# Patient Record
Sex: FEMALE | Race: WHITE | NOT HISPANIC OR LATINO | Employment: FULL TIME | URBAN - METROPOLITAN AREA
[De-identification: names, ages, dates, MRNs, and addresses within clinical notes are randomized per-mention and may not be internally consistent; named-entity substitution may affect disease eponyms.]

---

## 2017-01-08 ASSESSMENT — ENCOUNTER SYMPTOMS
INCREASED ENERGY: 0
TASTE DISTURBANCE: 0
TINGLING: 1
SKIN CHANGES: 0
HEARTBURN: 1
SINUS PAIN: 1
ALTERED TEMPERATURE REGULATION: 0
POLYPHAGIA: 0
JOINT SWELLING: 0
VOMITING: 0
SMELL DISTURBANCE: 0
HYPERTENSION: 1
POSTURAL DYSPNEA: 0
SLEEP DISTURBANCES DUE TO BREATHING: 0
WEIGHT GAIN: 0
NECK PAIN: 1
DIZZINESS: 0
TROUBLE SWALLOWING: 0
EYE WATERING: 0
DOUBLE VISION: 0
CONSTIPATION: 0
POOR WOUND HEALING: 0
MYALGIAS: 0
DEPRESSION: 0
STIFFNESS: 1
WEIGHT LOSS: 0
SYNCOPE: 0
MUSCLE CRAMPS: 0
LEG PAIN: 0
DECREASED LIBIDO: 1
NUMBNESS: 1
SHORTNESS OF BREATH: 0
NAUSEA: 0
DIFFICULTY URINATING: 0
FATIGUE: 0
ARTHRALGIAS: 1
DISTURBANCES IN COORDINATION: 0
CLAUDICATION: 0
LIGHT-HEADEDNESS: 0
INSOMNIA: 0
SPEECH CHANGE: 0
PALPITATIONS: 0
DYSURIA: 0
MUSCLE WEAKNESS: 0
DYSPNEA ON EXERTION: 0
BLOOD IN STOOL: 0
RECTAL PAIN: 0
NIGHT SWEATS: 0
HEADACHES: 0
COUGH DISTURBING SLEEP: 0
SEIZURES: 0
DECREASED CONCENTRATION: 0
PARALYSIS: 0
EYE IRRITATION: 0
WEAKNESS: 0
HEMOPTYSIS: 0
TREMORS: 0
JAUNDICE: 0
EYE REDNESS: 0
NAIL CHANGES: 0
BLOATING: 1
ORTHOPNEA: 0
COUGH: 0
NECK MASS: 0
LEG SWELLING: 0
EXERCISE INTOLERANCE: 0
SPUTUM PRODUCTION: 0
DIARRHEA: 0
BOWEL INCONTINENCE: 0
MEMORY LOSS: 0
HOT FLASHES: 1
RESPIRATORY PAIN: 0
SORE THROAT: 0
TACHYCARDIA: 0
NERVOUS/ANXIOUS: 1
RECTAL BLEEDING: 0
HYPOTENSION: 0
POLYDIPSIA: 0
PANIC: 1
HALLUCINATIONS: 0
CHILLS: 0
HEMATURIA: 0
WHEEZING: 0
DECREASED APPETITE: 0
LOSS OF CONSCIOUSNESS: 0
HOARSE VOICE: 0
BACK PAIN: 1
FEVER: 0
EYE PAIN: 0
SNORES LOUDLY: 1
ABDOMINAL PAIN: 0
FLANK PAIN: 0

## 2017-01-08 ASSESSMENT — ANXIETY QUESTIONNAIRES
GAD7 TOTAL SCORE: 2
7. FEELING AFRAID AS IF SOMETHING AWFUL MIGHT HAPPEN: 1 = SEVERAL DAYS
GAD7 TOTAL SCORE: 2

## 2017-01-09 ASSESSMENT — ANXIETY QUESTIONNAIRES: GAD7 TOTAL SCORE: 2

## 2017-01-09 NOTE — PROGRESS NOTES
Sanket is a 61 year old female presents for annual exam: new patient to [facility]  - Last pap 2014  HPI:  - Health care is primarily with Homeopathy.   - Has white coat syndrome and elevated BP.  Not inclined to take BP medication. Last /88.  ROS:  General: Hot flashes 1-2/day  Head/Eyes: none  Ears/Nose/Throat: none  Cardiovascular: none  Respiratory: none  Gastrointestinal: none  Breast: none  Genitourinary: none  Sexual Function: not sexually active.  Flares of lesion [mostly rectal area] every 2-3 months.   Musculoskeletal: none  Skin: none  Neurological: none  Mental Health: none  Endocrine: none  PROBLEM LIST:  Patient Active Problem List   Diagnosis     CARDIOVASCULAR SCREENING; LDL GOAL LESS THAN 160     Raynaud's disease     CTS (carpal tunnel syndrome)     Herpes genitalia     Labial cyst     Obesity   OB/GYN HISTORY:   Menopause ~ hysterectomy . Still with cervix  Herpes genitalia flares [every 2-3 month] uses homeopathic remedy  Obstetric History       T0      TAB1   SAB0   E0   M0   L1       # Outcome Date GA Lbr Mario/2nd Weight Sex Delivery Anes PTL Lv   2 TAB            1 Para               PAST MEDICAL HISTORY:  Past Medical History   Diagnosis Date     Hypertension 2009     going through divorce     Raynaud's disease      CTS (carpal tunnel syndrome)      rt >lt      Labial cyst      Herpes genitalia      type 1 and 2   Life Style Modifiers:   Tobacco:  reports that she has never smoked. She has never used smokeless tobacco.   Alcohol:  reports that she does not drink alcohol.   Drug use:  reports that she does not use illicit drugs.  Exercise: none                Diet: ok  CAM Providers:      Homeopath: Yes  PAST SURGICAL HISTORY:  Past Surgical History   Procedure Laterality Date     Appendectomy  2010     Hysterectomy supracervical        removal of anal fissure        section     FAMILY HISTORY:  Mother with Hypertension age 90.   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 Single.   MEDICATIONS:  No current outpatient prescriptions on file.   ALLERGIES:  Atropine; Codeine; Epinephrine; Fentanyl; and Inh  VITAL:  Blood pressure 149/88, pulse 96.  PHYSICAL EXAM:  Constitutional: Well appearing woman in no acute distress.   Psychological: appropriate mood.  Eyes: anicteric, normal extra-ocular movements,    Ears, Nose and Throat: tympanic membranes clear, nose clear and free of lesions, throat clear, moist mucous membrames, neck supple with full range of motion.    Neck: No thyroidmegaly.  Cardiovascular: regular rate and rhythm, normal S1 and S2, no murmurs, rubs or gallops, peripheral pulses full and symmetric   Respiratory: clear to auscultation, no wheezes or crackles, normal breath sounds.  Breast: Symmetrical without visible distortion or swelling. No masses noted. No nipple inversion, no breast dimpling or puckering. Axillary area without masses or lympadenapathy.   Gastrointestinal: positive bowel sounds, nontender, no hepatosplenomegaly, no masses. No guarding or rebound.  Genitourinary: External genitalia with a labial cyst on the right [1.5 cm].  No enlargement of the Bartholin or [place] glands. Urethra and bladder are non-tender. Vagina is without lesions or discharge. Dry epithelium, no anterior or posterior wall defects. Cervix is smooth, without lesions.  Pap obtained/HPV obtained with cytobrush. Adnexa without tenderness or enlarged. Rectal exam with normal sphincter tone, no masses. Perineum without lesions.   Musculoskeletal: full range of motion    Skin: no concerning lesions, no jaundice.  Neurological: normal gait, no tremor.   Diagnoses and associated orders for this visit:  Annual physical exam   - declined immunizations   - does thermography - advised mammogram.  Cervical cancer screening  -     Obtaining, preparing and conveyance of cervical or vaginal smear to laboratory.  -      Pap imaged thin layer screen with HPV - recommended age 30 - 65 years (select HPV order below)  Labial cyst        -     Consider excision with GYN   Need for hepatitis C screening test  -     Hepatitis C antibody  Screening for thyroid disorder  -     TSH with free T4 reflex  Screening for diabetes mellitus  -     Basic Metabolic Panel  Elevated blood pressure reading without diagnosis of hypertension  -     Basic Metabolic Panel  -     Consider diuretic - will get a few more BP before starting a medicaiton   Herpes simplex vulvo/rectal area        -     Discussed option for Valtrex

## 2017-01-10 ENCOUNTER — OFFICE VISIT (OUTPATIENT)
Dept: FAMILY MEDICINE | Facility: CLINIC | Age: 62
End: 2017-01-10
Attending: FAMILY MEDICINE
Payer: COMMERCIAL

## 2017-01-10 ENCOUNTER — RADIANT APPOINTMENT (OUTPATIENT)
Dept: MAMMOGRAPHY | Facility: CLINIC | Age: 62
End: 2017-01-10
Attending: FAMILY MEDICINE
Payer: COMMERCIAL

## 2017-01-10 VITALS — HEART RATE: 96 BPM | DIASTOLIC BLOOD PRESSURE: 88 MMHG | SYSTOLIC BLOOD PRESSURE: 149 MMHG

## 2017-01-10 DIAGNOSIS — Z12.31 VISIT FOR SCREENING MAMMOGRAM: ICD-10-CM

## 2017-01-10 DIAGNOSIS — A60.04 HERPES SIMPLEX VULVOVAGINITIS: ICD-10-CM

## 2017-01-10 DIAGNOSIS — N90.7 LABIAL CYST: ICD-10-CM

## 2017-01-10 DIAGNOSIS — Z13.1 SCREENING FOR DIABETES MELLITUS: ICD-10-CM

## 2017-01-10 DIAGNOSIS — Z12.4 CERVICAL CANCER SCREENING: ICD-10-CM

## 2017-01-10 DIAGNOSIS — R03.0 ELEVATED BLOOD PRESSURE READING WITHOUT DIAGNOSIS OF HYPERTENSION: ICD-10-CM

## 2017-01-10 DIAGNOSIS — Z11.59 NEED FOR HEPATITIS C SCREENING TEST: ICD-10-CM

## 2017-01-10 DIAGNOSIS — Z00.00 ANNUAL PHYSICAL EXAM: Primary | ICD-10-CM

## 2017-01-10 DIAGNOSIS — Z13.29 SCREENING FOR THYROID DISORDER: ICD-10-CM

## 2017-01-10 LAB
ANION GAP SERPL CALCULATED.3IONS-SCNC: 6 MMOL/L (ref 3–14)
BUN SERPL-MCNC: 28 MG/DL (ref 7–30)
CALCIUM SERPL-MCNC: 8.8 MG/DL (ref 8.5–10.1)
CHLORIDE SERPL-SCNC: 104 MMOL/L (ref 94–109)
CO2 SERPL-SCNC: 29 MMOL/L (ref 20–32)
CREAT SERPL-MCNC: 0.88 MG/DL (ref 0.52–1.04)
GFR SERPL CREATININE-BSD FRML MDRD: 66 ML/MIN/1.7M2
GLUCOSE SERPL-MCNC: 106 MG/DL (ref 70–99)
HCV AB SERPL QL IA: NORMAL
POTASSIUM SERPL-SCNC: 3.9 MMOL/L (ref 3.4–5.3)
SODIUM SERPL-SCNC: 139 MMOL/L (ref 133–144)
TSH SERPL DL<=0.005 MIU/L-ACNC: 2.47 MU/L (ref 0.4–4)

## 2017-01-10 PROCEDURE — 80048 BASIC METABOLIC PNL TOTAL CA: CPT | Performed by: FAMILY MEDICINE

## 2017-01-10 PROCEDURE — 86803 HEPATITIS C AB TEST: CPT | Performed by: FAMILY MEDICINE

## 2017-01-10 PROCEDURE — G0202 SCR MAMMO BI INCL CAD: HCPCS

## 2017-01-10 PROCEDURE — G0123 SCREEN CERV/VAG THIN LAYER: HCPCS | Performed by: FAMILY MEDICINE

## 2017-01-10 PROCEDURE — 84443 ASSAY THYROID STIM HORMONE: CPT | Performed by: FAMILY MEDICINE

## 2017-01-10 PROCEDURE — G0145 SCR C/V CYTO,THINLAYER,RESCR: HCPCS | Performed by: FAMILY MEDICINE

## 2017-01-10 PROCEDURE — 36415 COLL VENOUS BLD VENIPUNCTURE: CPT | Performed by: FAMILY MEDICINE

## 2017-01-10 PROCEDURE — 99213 OFFICE O/P EST LOW 20 MIN: CPT | Mod: 25,ZF

## 2017-01-10 PROCEDURE — 87624 HPV HI-RISK TYP POOLED RSLT: CPT | Performed by: FAMILY MEDICINE

## 2017-01-10 NOTE — Clinical Note
1/10/2017       RE: Sanket Sepulveda  [address] [id number]     Dear Colleague,    Thank you for referring your patient, Sanket Sepulveda, to the [facility] at [facility]. Please see a copy of my visit note below.    Sanket is a 61 year old female presents for annual exam: new patient to [facility]  - Last pap   HPI:  - Health care is primarily with Homeopathy.   - Has white coat syndrome and elevated BP.  Not inclined to take BP medication. Last /88.  ROS:  General: Hot flashes 1-2/day  Head/Eyes: none  Ears/Nose/Throat: none  Cardiovascular: none  Respiratory: none  Gastrointestinal: none  Breast: none  Genitourinary: none  Sexual Function: not sexually active.  Flares of lesion [mostly rectal area] every 2-3 months.   Musculoskeletal: none  Skin: none  Neurological: none  Mental Health: none  Endocrine: none  PROBLEM LIST:  Patient Active Problem List   Diagnosis     CARDIOVASCULAR SCREENING; LDL GOAL LESS THAN 160     Raynaud's disease     CTS (carpal tunnel syndrome)     Herpes genitalia     Labial cyst     Obesity   OB/GYN HISTORY:   Menopause ~ hysterectomy . Still with cervix  Herpes genitalia flares [every 2-3 month] uses homeopathic remedy  Obstetric History       T0      TAB1   SAB0   E0   M0   L1       # Outcome Date GA Lbr Mario/2nd Weight Sex Delivery Anes PTL Lv   2 TAB            1 Para               PAST MEDICAL HISTORY:  Past Medical History   Diagnosis Date     Hypertension      going through divorce     Raynaud's disease      CTS (carpal tunnel syndrome)      rt >lt      Labial cyst      Herpes genitalia      type 1 and 2   Life Style Modifiers:   Tobacco:  reports that she has never smoked. She has never used smokeless tobacco.   Alcohol:  reports that she does not drink alcohol.   Drug use:  reports that she does not use illicit drugs.  Exercise: none                Diet: ok  CAM Providers:       Homeopath: Yes  PAST SURGICAL HISTORY:  Past Surgical History   Procedure Laterality Date     Appendectomy  2010     Hysterectomy supracervical        removal of anal fissure        section     FAMILY HISTORY:  Mother with Hypertension age 90.   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   MEDICATIONS:  No current outpatient prescriptions on file.   ALLERGIES:  Atropine; Codeine; Epinephrine; Fentanyl; and Inh  VITAL:  Blood pressure 149/88, pulse 96.  PHYSICAL EXAM:  Constitutional: Well appearing woman in no acute distress.   Psychological: appropriate mood.  Eyes: anicteric, normal extra-ocular movements,    Ears, Nose and Throat: tympanic membranes clear, nose clear and free of lesions, throat clear, moist mucous membrames, neck supple with full range of motion.    Neck: No thyroidmegaly.  Cardiovascular: regular rate and rhythm, normal S1 and S2, no murmurs, rubs or gallops, peripheral pulses full and symmetric   Respiratory: clear to auscultation, no wheezes or crackles, normal breath sounds.  Breast: Symmetrical without visible distortion or swelling. No masses noted. No nipple inversion, no breast dimpling or puckering. Axillary area without masses or lympadenapathy.   Gastrointestinal: positive bowel sounds, nontender, no hepatosplenomegaly, no masses. No guarding or rebound.  Genitourinary: External genitalia with a labial cyst on the right [1.5 cm].  No enlargement of the Bartholin or [place] glands. Urethra and bladder are non-tender. Vagina is without lesions or discharge. Dry epithelium, no anterior or posterior wall defects. Cervix is smooth, without lesions.  Pap obtained/HPV obtained with cytobrush. Adnexa without tenderness or enlarged. Rectal exam with normal sphincter tone, no masses. Perineum without lesions.   Musculoskeletal: full range of motion    Skin: no concerning lesions, no  jaundice.  Neurological: normal gait, no tremor.   Diagnoses and associated orders for this visit:  Annual physical exam   - declined immunizations   - does thermography - advised mammogram.  Cervical cancer screening  -     Obtaining, preparing and conveyance of cervical or vaginal smear to laboratory.  -     Pap imaged thin layer screen with HPV - recommended age 30 - 65 years (select HPV order below)  Labial cyst        -     Consider excision with GYN   Need for hepatitis C screening test  -     Hepatitis C antibody  Screening for thyroid disorder  -     TSH with free T4 reflex  Screening for diabetes mellitus  -     Basic Metabolic Panel  Elevated blood pressure reading without diagnosis of hypertension  -     Basic Metabolic Panel  -     Consider diuretic - will get a few more BP before starting a medicaiton   Herpes simplex vulvo/rectal area        -     Discussed option for Valtrex       Again, thank you for allowing me to participate in the care of your patient.      Sincerely,    Hailey Gomez MD

## 2017-01-12 LAB
COPATH REPORT: NORMAL
PAP: NORMAL

## 2017-01-19 LAB
FINAL DIAGNOSIS: NORMAL
HPV HR 12 DNA CVX QL NAA+PROBE: NEGATIVE
HPV16 DNA SPEC QL NAA+PROBE: NEGATIVE
HPV18 DNA SPEC QL NAA+PROBE: NEGATIVE
SPECIMEN DESCRIPTION: NORMAL

## 2017-12-20 NOTE — PROGRESS NOTES
Sanket is a 61 year old female presents symptoms:  Concerns:   - unexplained aches and pain that started after cleaning and remodeling her home with scrapping of paint.  Saw her naturopath and given a liver cleanse.  Had a herpes outbreak, rectal with mild headache and achy that may be causing some of her symptoms.  Gets an outbreak ~ four times a year [symptoms are less over years].    Most significant was RUQ pain last week that has now resolved.     Health care is primarily with homeopathist and naturopath   Having more gas pain - thinks it is from the protein powder and smoothy in the AM.  HCM: Last pap   Exercise: running since 2017.  Rec center with running track.   ROS:  General: Hot flashes 1-2/day  Head/Eyes: none  Ears/Nose/Throat: none  Cardiovascular: none  Respiratory: none  Gastrointestinal: none  Breast: none  Genitourinary: none  Sexual Function: not sexually active.  Flares of lesion [mostly rectal area] every 2-3 months.   Musculoskeletal: none  Skin: none  Neurological: none  Mental Health: none  Endocrine: none  PROBLEM LIST:  Patient Active Problem List   Diagnosis     CARDIOVASCULAR SCREENING; LDL GOAL LESS THAN 160     Raynaud's disease     CTS (carpal tunnel syndrome)     Herpes genitalia     Labial cyst     Obesity     Sebaceous cyst     Lipoma     Hypertension     Pain of hand     Gastroesophageal reflux disease     Chronic eczema   OB/GYN HISTORY:   Menopause ~ hysterectomy . Still with cervix  Herpes genitalia flares [every 2-3 month] uses homeopathic remedy  Obstetric History       T0      L1     SAB0   TAB1   Ectopic0   Multiple0   Live Births0       # Outcome Date GA Lbr Mario/2nd Weight Sex Delivery Anes PTL Lv   2 TAB            1 Para               PAST MEDICAL HISTORY:  Past Medical History:   Diagnosis Date     Anemia     Throughout 30s due to heavy period caused by uterine fibroid     Complication of anesthesia ,     prior to fissurectomy  "surgery in  and colonoscopy ?     CTS (carpal tunnel syndrome)     rt >lt      Herpes genitalia     type 1 and 2     Hypertension 2009    going through divorce     Labial cyst      Raynaud's disease      Thyroid disease ?    Hypothyroid, treated homeopathically   Life Style Modifiers:   Tobacco:  reports that she has never smoked. She has never used smokeless tobacco.   Alcohol:  reports that she drinks alcohol.   Drug use:  reports that she does not use illicit drugs.  Exercise: none                Diet: ok  CAM Providers:      Homeopath: Yes  PAST SURGICAL HISTORY:  Past Surgical History:   Procedure Laterality Date     APPENDECTOMY  2010     BIOPSY      prior to upper abdominal lump removed      SECTION       COLONOSCOPY  ?    complications with anesthesia and results     GENITOURINARY SURGERY      bladder cut open from  complications     HC REMOVAL OF ANAL FISSURE       HYSTERECTOMY SUPRACERVICAL       SOFT TISSUE SURGERY      upper abdominal lump removed   FAMILY HISTORY:  Mother with Hypertension age 90.   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   MEDICATIONS:  No current outpatient prescriptions on file.   ALLERGIES:  Atropine; Codeine; Epinephrine; Fentanyl; and Inh [isoniazid]  VITAL:  Blood pressure (!) 154/99, pulse 102, height 1.626 m (5' 4\"), weight 88.8 kg (195 lb 12.8 oz).  PHYSICAL EXAM:  Constitutional: Well appearing woman in no acute distress.   Psychological: appropriate mood.  Eyes: anicteric, normal extra-ocular movements,    Gastrointestinal: positive bowel sounds, nontender, no hepatosplenomegaly, no masses. No guarding or rebound.  Musculoskeletal: full range of motion    Skin: no concerning lesions, no jaundice.  Neurological: normal gait, no tremor.   Diagnoses and associated orders for this visit:  Elevated blood pressure reading without diagnosis of " hypertension  -     Advised hydrochlorothiazide (MICROZIDE) 12.5 MG capsule; Take 1 capsule (12.5 mg) by mouth daily.  She will consider taking.   -     Basic Metabolic Panel; Future  Multiple joint pain  -     CBC with Platelets; Future  -     CRP Inflammation; Future  -     Hepatic Panel; Future  -     Lead Screening  Herpes simplex infection of rectum  -   Advised Valtrex - she will consider  -   Uses a homeopathic remedy with flares

## 2017-12-21 ENCOUNTER — OFFICE VISIT (OUTPATIENT)
Dept: FAMILY MEDICINE | Facility: CLINIC | Age: 62
End: 2017-12-21
Attending: FAMILY MEDICINE
Payer: COMMERCIAL

## 2017-12-21 VITALS
WEIGHT: 195.8 LBS | HEIGHT: 64 IN | SYSTOLIC BLOOD PRESSURE: 154 MMHG | HEART RATE: 102 BPM | BODY MASS INDEX: 33.43 KG/M2 | DIASTOLIC BLOOD PRESSURE: 99 MMHG

## 2017-12-21 DIAGNOSIS — M25.50 MULTIPLE JOINT PAIN: ICD-10-CM

## 2017-12-21 DIAGNOSIS — A60.1 HERPES SIMPLEX INFECTION OF RECTUM: ICD-10-CM

## 2017-12-21 DIAGNOSIS — R03.0 ELEVATED BLOOD PRESSURE READING WITHOUT DIAGNOSIS OF HYPERTENSION: Primary | ICD-10-CM

## 2017-12-21 LAB
ALBUMIN SERPL-MCNC: 3.6 G/DL (ref 3.4–5)
ALP SERPL-CCNC: 66 U/L (ref 40–150)
ALT SERPL W P-5'-P-CCNC: 25 U/L (ref 0–50)
ANION GAP SERPL CALCULATED.3IONS-SCNC: 5 MMOL/L (ref 3–14)
AST SERPL W P-5'-P-CCNC: 14 U/L (ref 0–45)
BILIRUB DIRECT SERPL-MCNC: <0.1 MG/DL (ref 0–0.2)
BILIRUB SERPL-MCNC: 0.4 MG/DL (ref 0.2–1.3)
BUN SERPL-MCNC: 21 MG/DL (ref 7–30)
CALCIUM SERPL-MCNC: 9.2 MG/DL (ref 8.5–10.1)
CHLORIDE SERPL-SCNC: 104 MMOL/L (ref 94–109)
CO2 SERPL-SCNC: 29 MMOL/L (ref 20–32)
CREAT SERPL-MCNC: 0.78 MG/DL (ref 0.52–1.04)
CRP SERPL-MCNC: <2.9 MG/L (ref 0–8)
ERYTHROCYTE [DISTWIDTH] IN BLOOD BY AUTOMATED COUNT: 13.9 % (ref 10–15)
GFR SERPL CREATININE-BSD FRML MDRD: 76 ML/MIN/1.7M2
GLUCOSE SERPL-MCNC: 91 MG/DL (ref 70–99)
HCT VFR BLD AUTO: 44 % (ref 35–47)
HGB BLD-MCNC: 14.4 G/DL (ref 11.7–15.7)
MCH RBC QN AUTO: 29.1 PG (ref 26.5–33)
MCHC RBC AUTO-ENTMCNC: 32.7 G/DL (ref 31.5–36.5)
MCV RBC AUTO: 89 FL (ref 78–100)
PLATELET # BLD AUTO: 257 10E9/L (ref 150–450)
POTASSIUM SERPL-SCNC: 5.5 MMOL/L (ref 3.4–5.3)
PROT SERPL-MCNC: 7.3 G/DL (ref 6.8–8.8)
RBC # BLD AUTO: 4.95 10E12/L (ref 3.8–5.2)
SODIUM SERPL-SCNC: 138 MMOL/L (ref 133–144)
WBC # BLD AUTO: 4.3 10E9/L (ref 4–11)

## 2017-12-21 PROCEDURE — 83655 ASSAY OF LEAD: CPT | Performed by: FAMILY MEDICINE

## 2017-12-21 PROCEDURE — 36415 COLL VENOUS BLD VENIPUNCTURE: CPT | Performed by: FAMILY MEDICINE

## 2017-12-21 PROCEDURE — 80076 HEPATIC FUNCTION PANEL: CPT | Performed by: FAMILY MEDICINE

## 2017-12-21 PROCEDURE — 80048 BASIC METABOLIC PNL TOTAL CA: CPT | Performed by: FAMILY MEDICINE

## 2017-12-21 PROCEDURE — 86140 C-REACTIVE PROTEIN: CPT | Performed by: FAMILY MEDICINE

## 2017-12-21 PROCEDURE — 85027 COMPLETE CBC AUTOMATED: CPT | Performed by: FAMILY MEDICINE

## 2017-12-21 RX ORDER — HYDROCHLOROTHIAZIDE 12.5 MG/1
12.5 CAPSULE ORAL DAILY
Qty: 30 CAPSULE | Refills: 1 | Status: SHIPPED | OUTPATIENT
Start: 2017-12-21 | End: 2018-06-06

## 2017-12-21 RX ORDER — VALACYCLOVIR HYDROCHLORIDE 1 G/1
1000 TABLET, FILM COATED ORAL 3 TIMES DAILY
Qty: 21 TABLET | Refills: 1 | Status: SHIPPED | OUTPATIENT
Start: 2017-12-21 | End: 2022-09-01

## 2017-12-21 ASSESSMENT — PAIN SCALES - GENERAL: PAINLEVEL: NO PAIN (0)

## 2017-12-21 NOTE — NURSING NOTE
Chief Complaint   Patient presents with     Generalized Body Aches     Right side pain. Pt has concerns of lead paint and possibly other toxin exposure.       B/P readings 148/90, 157/113, 158/93

## 2017-12-21 NOTE — LETTER
2017       RE: Sanket Sepulveda  [address] [place] [address]     Dear Colleague,    Thank you for referring your patient, Sanket Sepulveda, to the [facility] at [facility]. Please see a copy of my visit note below.    Sanket is a 61 year old female presents symptoms:  Concerns:   - unexplained aches and pain that started after cleaning and remodeling her home with scrapping of paint.  Saw her naturopath and given a liver cleanse.  Had a herpes outbreak, rectal with mild headache and achy that may be causing some of her symptoms.  Gets an outbreak ~ four times a year [symptoms are less over years].    Most significant was RUQ pain last week that has now resolved.     Health care is primarily with homeopathist and naturopath   Having more gas pain - thinks it is from the protein powder and smoothy in the AM.  HCM: Last pap   Exercise: running since 2017.  Rec center with running track.   ROS:  General: Hot flashes 1-2/day  Head/Eyes: none  Ears/Nose/Throat: none  Cardiovascular: none  Respiratory: none  Gastrointestinal: none  Breast: none  Genitourinary: none  Sexual Function: not sexually active.  Flares of lesion [mostly rectal area] every 2-3 months.   Musculoskeletal: none  Skin: none  Neurological: none  Mental Health: none  Endocrine: none  PROBLEM LIST:  Patient Active Problem List   Diagnosis     CARDIOVASCULAR SCREENING; LDL GOAL LESS THAN 160     Raynaud's disease     CTS (carpal tunnel syndrome)     Herpes genitalia     Labial cyst     Obesity     Sebaceous cyst     Lipoma     Hypertension     Pain of hand     Gastroesophageal reflux disease     Chronic eczema   OB/GYN HISTORY:   Menopause ~ hysterectomy . Still with cervix  Herpes genitalia flares [every 2-3 month] uses homeopathic remedy  Obstetric History       T0      L1     SAB0   TAB1   Ectopic0   Multiple0   Live Births0       # Outcome Date GA Lbr  "Mario/2nd Weight Sex Delivery Anes PTL Lv   2 TAB            1 Para               PAST MEDICAL HISTORY:  Past Medical History:   Diagnosis Date     Anemia     Throughout 30s due to heavy period caused by uterine fibroid     Complication of anesthesia ,     prior to fissurectomy surgery in  and colonoscopy ?     CTS (carpal tunnel syndrome)     rt >lt      Herpes genitalia     type 1 and 2     Hypertension     going through divorce     Labial cyst      Raynaud's disease      Thyroid disease ?    Hypothyroid, treated homeopathically   Life Style Modifiers:   Tobacco:  reports that she has never smoked. She has never used smokeless tobacco.   Alcohol:  reports that she drinks alcohol.   Drug use:  reports that she does not use illicit drugs.  Exercise: none                Diet: ok  CAM Providers:      Homeopath: Yes  PAST SURGICAL HISTORY:  Past Surgical History:   Procedure Laterality Date     APPENDECTOMY       BIOPSY      prior to upper abdominal lump removed      SECTION       COLONOSCOPY  ?    complications with anesthesia and results     GENITOURINARY SURGERY      bladder cut open from  complications     HC REMOVAL OF ANAL FISSURE       HYSTERECTOMY SUPRACERVICAL       SOFT TISSUE SURGERY      upper abdominal lump removed   FAMILY HISTORY:  Mother with Hypertension age 90.   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   MEDICATIONS:  No current outpatient prescriptions on file.   ALLERGIES:  Atropine; Codeine; Epinephrine; Fentanyl; and Inh [isoniazid]  VITAL:  Blood pressure (!) 154/99, pulse 102, height 1.626 m (5' 4\"), weight 88.8 kg (195 lb 12.8 oz).  PHYSICAL EXAM:  Constitutional: Well appearing woman in no acute distress.   Psychological: appropriate mood.  Eyes: anicteric, normal extra-ocular movements,    Gastrointestinal: positive bowel sounds, nontender, " no hepatosplenomegaly, no masses. No guarding or rebound.  Musculoskeletal: full range of motion    Skin: no concerning lesions, no jaundice.  Neurological: normal gait, no tremor.   Diagnoses and associated orders for this visit:  Elevated blood pressure reading without diagnosis of hypertension  -     Advised hydrochlorothiazide (MICROZIDE) 12.5 MG capsule; Take 1 capsule (12.5 mg) by mouth daily.  She will consider taking.   -     Basic Metabolic Panel; Future  Multiple joint pain  -     CBC with Platelets; Future  -     CRP Inflammation; Future  -     Hepatic Panel; Future  -     Lead Screening  Herpes simplex infection of rectum  -   Advised Valtrex - she will consider  -   Uses a homeopathic remedy with flares        Again, thank you for allowing me to participate in the care of your patient.      Sincerely,    Hailey Gomez MD

## 2017-12-21 NOTE — MR AVS SNAPSHOT
After Visit Summary   12/21/2017    Sanket Sepulveda    MRN: [id number]           Patient Information     Date Of Birth          1955        Visit Information        Provider Department      12/21/2017 9:20 AM Hailey Gomez MD [facility]        Today's Diagnoses     Elevated blood pressure reading without diagnosis of hypertension    -  1    Multiple joint pain        Herpes simplex infection of rectum           Follow-ups after your visit        Future tests that were ordered for you today     Open Future Orders        Priority Expected Expires Ordered    Basic Metabolic Panel Routine  12/21/2018 12/21/2017    CBC with Platelets Routine  12/21/2018 12/21/2017    CRP Inflammation Routine  12/21/2018 12/21/2017    Hepatic Panel Routine  12/21/2018 12/21/2017            Who to contact     Please call your clinic at [phone] to:    Ask questions about your health    Make or cancel appointments    Discuss your medicines    Learn about your test results    Speak to your doctor   If you have compliments or concerns about an experience at your clinic, or if you wish to file a complaint, please contact [facility] Patient Relations at [phone] or email us at [email or web address]         Additional Information About Your Visit        MyChart Information     [employer] gives you secure access to your electronic health record. If you see a primary care provider, you can also send messages to your care team and make appointments. If you have questions, please call your primary care clinic.  If you do not have a primary care provider, please call [phone] and they will assist you.      [employer] is an electronic gateway that provides easy, online access to your medical records. With [employer], you can request a clinic appointment, read your test results, renew a prescription or communicate with your care team.     To access your existing  "account, please contact your [facility] or call [phone] for assistance.        Care EveryWhere ID     This is your Care EveryWhere ID. This could be used by other organizations to access your [facility] medical records  [id number]        Your Vitals Were     Pulse Height BMI (Body Mass Index)             102 1.626 m (5' 4\") 33.61 kg/m2          Blood Pressure from Last 3 Encounters:   12/21/17 (!) 154/99   01/10/17 149/88   10/01/14 148/88    Weight from Last 3 Encounters:   12/21/17 88.8 kg (195 lb 12.8 oz)   10/01/14 92.1 kg (203 lb)   03/31/14 89.6 kg (197 lb 8 oz)              We Performed the Following     Lead Screening          Today's Medication Changes          These changes are accurate as of: 12/21/17  9:57 AM.  If you have any questions, ask your nurse or doctor.               Start taking these medicines.        Dose/Directions    hydrochlorothiazide 12.5 MG capsule   Commonly known as:  MICROZIDE   Used for:  Elevated blood pressure reading without diagnosis of hypertension   Started by:  Hailey Gomez MD        Dose:  12.5 mg   Take 1 capsule (12.5 mg) by mouth daily   Quantity:  30 capsule   Refills:  1       valACYclovir 1000 mg tablet   Commonly known as:  VALTREX   Used for:  Herpes simplex infection of rectum   Started by:  Hailey Gomez MD        Dose:  1000 mg   Take 1 tablet (1,000 mg) by mouth 3 times daily   Quantity:  21 tablet   Refills:  1            Where to get your medicines      These medications were sent to [facility] - [place], [place] - [address]  [address], [place] [address]     Phone:  [phone]     hydrochlorothiazide 12.5 MG capsule    valACYclovir 1000 mg tablet                Primary Care Provider Office Phone # Fax #    Constance Cr [phone]       [facility] [address] [address]        Equal Access to Services     RIA MADDEN AH: Hadii cait " leilani Salmon, wabekahda lugilismael, qadonnata kaimkala pacheco, roland derekin hayaadante alexanderjuju meaghanlydia lahersondante chucky. So [facility] [phone].    ATENCIÓN: Si habla español, tiene a bajwa disposición servicios gratuitos de asistencia lingüística. Elvira al [phone].    We comply with applicable federal civil rights laws and [place] laws. We do not discriminate on the basis of race, color, national origin, age, disability, sex, sexual orientation, or gender identity.            Thank you!     Thank you for choosing [facility]  for your care. Our goal is always to provide you with excellent care. Hearing back from our patients is one way we can continue to improve our services. Please take a few minutes to complete the written survey that you may receive in the mail after your visit with us. Thank you!             Your Updated Medication List - Protect others around you: Learn how to safely use, store and throw away your medicines at www.disposemymeds.org.          This list is accurate as of: 12/21/17  9:57 AM.  Always use your most recent med list.                   Brand Name Dispense Instructions for use Diagnosis    hydrochlorothiazide 12.5 MG capsule    MICROZIDE    30 capsule    Take 1 capsule (12.5 mg) by mouth daily    Elevated blood pressure reading without diagnosis of hypertension       valACYclovir 1000 mg tablet    VALTREX    21 tablet    Take 1 tablet (1,000 mg) by mouth 3 times daily    Herpes simplex infection of rectum

## 2017-12-23 LAB — LEAD BLDV-MCNC: <2 UG/DL (ref 0–4.9)

## 2018-04-24 ENCOUNTER — MYC MEDICAL ADVICE (OUTPATIENT)
Dept: FAMILY MEDICINE | Facility: CLINIC | Age: 63
End: 2018-04-24

## 2018-04-24 DIAGNOSIS — M65.949 TENOSYNOVITIS OF HAND: Primary | ICD-10-CM

## 2018-05-17 NOTE — TELEPHONE ENCOUNTER
----- Message from Tammi Chappell sent at 5/15/2018 10:06 AM CDT -----  Regarding: [facility] calling for OT orders  [facility] ([facility]) calling. Patient of Dr. Gomez's is coming in for occupational therapy, patient is coming in today at 3 p.m. & they need orders. Their fax number is [phone].       Thank you!  Brittany    Call Center       Please DO NOT send this message and/or reply back to sender. Call Center Representatives DO NOT respond to messages.

## 2018-06-06 ENCOUNTER — OFFICE VISIT (OUTPATIENT)
Dept: FAMILY MEDICINE | Facility: CLINIC | Age: 63
End: 2018-06-06
Attending: FAMILY MEDICINE
Payer: COMMERCIAL

## 2018-06-06 VITALS
HEIGHT: 64 IN | SYSTOLIC BLOOD PRESSURE: 165 MMHG | DIASTOLIC BLOOD PRESSURE: 86 MMHG | WEIGHT: 199 LBS | HEART RATE: 93 BPM | BODY MASS INDEX: 33.97 KG/M2
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
## 2018-06-06 DIAGNOSIS — I10 BENIGN ESSENTIAL HYPERTENSION: ICD-10-CM

## 2018-06-06 LAB
ANION GAP SERPL CALCULATED.3IONS-SCNC: 4 MMOL/L (ref 3–14)
BUN SERPL-MCNC: 24 MG/DL (ref 7–30)
CALCIUM SERPL-MCNC: 9.5 MG/DL (ref 8.5–10.1)
CHLORIDE SERPL-SCNC: 101 MMOL/L (ref 94–109)
CO2 SERPL-SCNC: 32 MMOL/L (ref 20–32)
CREAT SERPL-MCNC: 0.79 MG/DL (ref 0.52–1.04)
GFR SERPL CREATININE-BSD FRML MDRD: 74 ML/MIN/1.7M2
GLUCOSE SERPL-MCNC: 91 MG/DL (ref 70–99)
POTASSIUM SERPL-SCNC: 5.1 MMOL/L (ref 3.4–5.3)
SODIUM SERPL-SCNC: 137 MMOL/L (ref 133–144)

## 2018-06-06 PROCEDURE — 80048 BASIC METABOLIC PNL TOTAL CA: CPT | Performed by: FAMILY MEDICINE

## 2018-06-06 PROCEDURE — 36415 COLL VENOUS BLD VENIPUNCTURE: CPT | Performed by: FAMILY MEDICINE

## 2018-06-06 PROCEDURE — G0463 HOSPITAL OUTPT CLINIC VISIT: HCPCS | Mod: ZF

## 2018-06-06 PROCEDURE — 86618 LYME DISEASE ANTIBODY: CPT | Performed by: FAMILY MEDICINE

## 2018-06-06 RX ORDER — HYDROCHLOROTHIAZIDE 12.5 MG/1
12.5 CAPSULE ORAL DAILY
Qty: 90 CAPSULE | Refills: 1 | Status: SHIPPED | OUTPATIENT
Start: 2018-06-06 | End: 2022-09-01

## 2018-06-06 ASSESSMENT — PAIN SCALES - GENERAL: PAINLEVEL: NO PAIN (0)

## 2018-06-06 NOTE — MR AVS SNAPSHOT
"              After Visit Summary   6/6/2018    Sanket Sepulveda    MRN: [id number]           Patient Information     Date Of Birth          1955        Visit Information        Provider Department      6/6/2018 9:40 AM Hailey Gomez MD [facility]        Today's Diagnoses     Tick bite, initial encounter    -  1    Benign essential hypertension           Follow-ups after your visit        Who to contact     Please call your clinic at [phone] to:    Ask questions about your health    Make or cancel appointments    Discuss your medicines    Learn about your test results    Speak to your doctor            Additional Information About Your Visit        MyChart Information     [employer] gives you secure access to your electronic health record. If you see a primary care provider, you can also send messages to your care team and make appointments. If you have questions, please call your primary care clinic.  If you do not have a primary care provider, please call [phone] and they will assist you.      [employer] is an electronic gateway that provides easy, online access to your medical records. With [employer], you can request a clinic appointment, read your test results, renew a prescription or communicate with your care team.     To access your existing account, please contact your [facility] or call [phone] for assistance.        Care EveryWhere ID     This is your Care EveryWhere ID. This could be used by other organizations to access your [place] medical records  [id number]        Your Vitals Were     Pulse Height BMI (Body Mass Index)             93 1.626 m (5' 4\") 34.16 kg/m2          Blood Pressure from Last 3 Encounters:   06/06/18 165/86   12/21/17 (!) 154/99   01/10/17 149/88    Weight from Last 3 Encounters:   06/06/18 90.3 kg (199 lb)   12/21/17 88.8 kg (195 lb 12.8 oz)   10/01/14 92.1 kg (203 lb)              We Performed the Following "     Basic Metabolic Panel     Lyme Disease Natalee with reflex to WB Serum          Where to get your medicines      These medications were sent to [facility] - [place], [place] - [address]  [address], [place] [address]     Phone:  [phone]     hydrochlorothiazide 12.5 MG capsule          Primary Care Provider Office Phone # Fax #    Constance Cr [phone]       [facility] [address]  [place] [address]        Equal Access to Services     [facility]: Hadii aad ku hadasho Soomaali, waaxda luqadaha, qaybta kaalmada adeegyada, waxay idiin hayaan adeeg kharash lamireya . So [facility] [phone].    ATENCIÓN: Si habla español, tiene a bajwa disposición servicios gratuitos de asistencia lingüística. [facility] [phone].    We comply with applicable federal civil rights laws and [place] laws. We do not discriminate on the basis of race, color, national origin, age, disability, sex, sexual orientation, or gender identity.            Thank you!     Thank you for choosing [facility]  for your care. Our goal is always to provide you with excellent care. Hearing back from our patients is one way we can continue to improve our services. Please take a few minutes to complete the written survey that you may receive in the mail after your visit with us. Thank you!             Your Updated Medication List - Protect others around you: Learn how to safely use, store and throw away your medicines at www.disposemymeds.org.          This list is accurate as of 6/6/18 11:18 AM.  Always use your most recent med list.                   Brand Name Dispense Instructions for use Diagnosis    hydrochlorothiazide 12.5 MG capsule    MICROZIDE    90 capsule    Take 1 capsule (12.5 mg) by mouth daily        valACYclovir 1000 mg tablet    VALTREX    21 tablet    Take 1 tablet (1,000 mg) by mouth 3 times daily    Herpes simplex infection of rectum

## 2018-06-06 NOTE — PROGRESS NOTES
Sanket is a 63 year old female presents for follow-up on BP and multiple joint pains:   =============================================================  Concerns:    - Found a wood tic between her toes last night - noted a rash this AM[place]   - One month ago had sore throat and fatigue that persists     Doing a program of OMADA on line for lifestyle management:     More activity is the most important   Wt Readings from Last 5 Encounters:   18 90.3 kg (199 lb)   17 88.8 kg (195 lb 12.8 oz)   10/01/14 92.1 kg (203 lb)   14 89.6 kg (197 lb 8 oz)       BP: not high at home.  No longer taking HCTZ 12.5 mg  BP Readings from Last 3 Encounters:   18 165/86   17 (!) 154/99   01/10/17 149/88   ROS:  General: Hot flashes 1-2/day  Head/Eyes: none  Ears/Nose/Throat: none  Cardiovascular: none  Respiratory: none  Gastrointestinal: none  Breast: none  Genitourinary: none  Sexual Function: not sexually active.  Flares of lesion [mostly rectal area] every 2-3 months.   Musculoskeletal: none  Skin: nasal lesion  Neurological: none  Mental Health: none  Endocrine: none  PROBLEM LIST:  Patient Active Problem List   Diagnosis     CARDIOVASCULAR SCREENING; LDL GOAL LESS THAN 160     Raynaud's disease     CTS (carpal tunnel syndrome)     Herpes genitalia     Labial cyst     Obesity     Sebaceous cyst     Lipoma     Hypertension     Pain of hand     Gastroesophageal reflux disease     Chronic eczema   OB/GYN HISTORY:   Menopause ~ hysterectomy . Still with cervix  Herpes genitalia flares [every 2-3 month] uses homeopathic remedy  Obstetric History       T0      L1     SAB0   TAB1   Ectopic0   Multiple0   Live Births0       # Outcome Date GA Lbr Mario/2nd Weight Sex Delivery Anes PTL Lv   2 TAB            1 Para               PAST MEDICAL HISTORY:  Past Medical History:   Diagnosis Date     Anemia     Throughout 30s due to heavy period caused by uterine fibroid     Complication  "of anesthesia ,     prior to fissurectomy surgery in  and colonoscopy ?     CTS (carpal tunnel syndrome)     rt >lt      Herpes genitalia     type 1 and 2     Hypertension 2009    going through divorce     Labial cyst      Raynaud's disease      Thyroid disease ?    Hypothyroid, treated homeopathically   Life Style Modifiers:   Tobacco:  reports that she has never smoked. She has never used smokeless tobacco.   Alcohol:  reports that she drinks alcohol.   Drug use:  reports that she does not use illicit drugs.  Exercise: none                Diet: ok  CAM Providers:      Homeopath: Yes  PAST SURGICAL HISTORY:  Past Surgical History:   Procedure Laterality Date     APPENDECTOMY  2010     BIOPSY      prior to upper abdominal lump removed      SECTION       COLONOSCOPY  ?    complications with anesthesia and results     GENITOURINARY SURGERY      bladder cut open from  complications     HC REMOVAL OF ANAL FISSURE       HYSTERECTOMY SUPRACERVICAL       SOFT TISSUE SURGERY      upper abdominal lump removed   FAMILY HISTORY:  Mother with Hypertension age 91. Only child.   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  This fall with start with [employer]. Single.   MEDICATIONS:  Current Outpatient Prescriptions   Medication Sig Dispense Refill     hydrochlorothiazide (MICROZIDE) 12.5 MG capsule Take 1 capsule (12.5 mg) by mouth daily 30 capsule 1     valACYclovir (VALTREX) 1000 mg tablet Take 1 tablet (1,000 mg) by mouth 3 times daily 21 tablet 1   ALLERGIES:  Atropine; Codeine; Epinephrine; Fentanyl; and Inh [isoniazid]  VITAL:  Blood pressure 165/86, pulse 93, height 1.626 m (5' 4\"), weight 90.3 kg (199 lb).   BP Readings from Last 3 Encounters:   18 165/86   17 (!) 154/99   01/10/17 149/88   PHYSICAL EXAM:  Constitutional: Well appearing woman in no acute distress.   Psychological: appropriate mood.  Eyes: " anicteric, normal extra-ocular movements,    ENT: TM normal.  Throat without erythema or exudate. Neck: no lymphadenopathy   COR: RRR without murmur  Musculoskeletal: full range of motion    Skin: Between toes on right toes 2-3 is a faint rash.   Neurological: normal gait, no tremor.  Diagnoses and associated orders for this visit:  Tick bite, initial encounter [wood tic].  [place]   -     Lyme Disease Natalee with reflex to WB Serum  Benign essential hypertension  -     Basic Metabolic Panel;   -     hydrochlorothiazide (MICROZIDE) 12.5 MG capsule; Take 1 capsule (12.5 mg) by mouth daily  -     Life style directives: exercise, weight loss.   -     Follow-up in two months.

## 2018-06-06 NOTE — LETTER
2018       RE: Sanket Sepulveda  [address] [place] [address]     Dear Colleague,    Thank you for referring your patient, Sanket Sepulveda, to the [facility] at [facility]. Please see a copy of my visit note below.    Sanket is a 63 year old female presents for follow-up on BP and multiple joint pains:   =============================================================  Concerns:    - Found a wood tic between her toes last night - noted a rash this AM. [place]   - One month ago had sore throat and fatigue that persists     Doing a program of OMADA on line for lifestyle management:     More activity is the most important   Wt Readings from Last 5 Encounters:   18 90.3 kg (199 lb)   17 88.8 kg (195 lb 12.8 oz)   10/01/14 92.1 kg (203 lb)   14 89.6 kg (197 lb 8 oz)       BP: not high at home.  No longer taking HCTZ 12.5 mg  BP Readings from Last 3 Encounters:   18 165/86   17 (!) 154/99   01/10/17 149/88   ROS:  General: Hot flashes 1-2/day  Head/Eyes: none  Ears/Nose/Throat: none  Cardiovascular: none  Respiratory: none  Gastrointestinal: none  Breast: none  Genitourinary: none  Sexual Function: not sexually active.  Flares of lesion [mostly rectal area] every 2-3 months.   Musculoskeletal: none  Skin: nasal lesion  Neurological: none  Mental Health: none  Endocrine: none  PROBLEM LIST:  Patient Active Problem List   Diagnosis     CARDIOVASCULAR SCREENING; LDL GOAL LESS THAN 160     Raynaud's disease     CTS (carpal tunnel syndrome)     Herpes genitalia     Labial cyst     Obesity     Sebaceous cyst     Lipoma     Hypertension     Pain of hand     Gastroesophageal reflux disease     Chronic eczema   OB/GYN HISTORY:   Menopause ~ hysterectomy . Still with cervix  Herpes genitalia flares [every 2-3 month] uses homeopathic remedy  Obstetric History       T0      L1     SAB0   TAB1   Ectopic0    Multiple0   Live Births0       # Outcome Date GA Lbr Mario/2nd Weight Sex Delivery Anes PTL Lv   2 TAB            1 Para               PAST MEDICAL HISTORY:  Past Medical History:   Diagnosis Date     Anemia     Throughout 30s due to heavy period caused by uterine fibroid     Complication of anesthesia ,     prior to fissurectomy surgery in  and colonoscopy ?     CTS (carpal tunnel syndrome)     rt >lt      Herpes genitalia     type 1 and 2     Hypertension     going through divorce     Labial cyst      Raynaud's disease      Thyroid disease ?    Hypothyroid, treated homeopathically   Life Style Modifiers:   Tobacco:  reports that she has never smoked. She has never used smokeless tobacco.   Alcohol:  reports that she drinks alcohol.   Drug use:  reports that she does not use illicit drugs.  Exercise: none                Diet: ok  CAM Providers:      Homeopath: Yes  PAST SURGICAL HISTORY:  Past Surgical History:   Procedure Laterality Date     APPENDECTOMY       BIOPSY      prior to upper abdominal lump removed      SECTION       COLONOSCOPY  ?    complications with anesthesia and results     GENITOURINARY SURGERY      bladder cut open from  complications     HC REMOVAL OF ANAL FISSURE       HYSTERECTOMY SUPRACERVICAL       SOFT TISSUE SURGERY      upper abdominal lump removed   FAMILY HISTORY:  Mother with Hypertension age 91. Only child.   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 Single.   MEDICATIONS:  Current Outpatient Prescriptions   Medication Sig Dispense Refill     hydrochlorothiazide (MICROZIDE) 12.5 MG capsule Take 1 capsule (12.5 mg) by mouth daily 30 capsule 1     valACYclovir (VALTREX) 1000 mg tablet Take 1 tablet (1,000 mg) by mouth 3 times daily 21 tablet 1   ALLERGIES:  Atropine; Codeine; Epinephrine; Fentanyl; and Inh [isoniazid]  VITAL:  Blood  "pressure 165/86, pulse 93, height 1.626 m (5' 4\"), weight 90.3 kg (199 lb).   BP Readings from Last 3 Encounters:   06/06/18 165/86   12/21/17 (!) 154/99   01/10/17 149/88   PHYSICAL EXAM:  Constitutional: Well appearing woman in no acute distress.   Psychological: appropriate mood.  Eyes: anicteric, normal extra-ocular movements,    ENT: TM normal.  Throat without erythema or exudate. Neck: no lymphadenopathy   COR: RRR without murmur  Musculoskeletal: full range of motion    Skin: Between toes on right toes 2-3 is a faint rash.   Neurological: normal gait, no tremor.  Diagnoses and associated orders for this visit:  Tick bite, initial encounter [jason foster].  [facility].   -     Lyme Disease Natalee with reflex to WB Serum  Benign essential hypertension  -     Basic Metabolic Panel;   -     hydrochlorothiazide (MICROZIDE) 12.5 MG capsule; Take 1 capsule (12.5 mg) by mouth daily  -     Life style directives: exercise, weight loss.   -     Follow-up in two months.             "

## 2018-06-06 NOTE — NURSING NOTE
Chief Complaint   Patient presents with     Insect Bites     Pt c/o tick bite on right foot, 3rd toe. Pt states rash started today and loose stool today.     B/P average 162/89

## 2018-06-07 LAB — B BURGDOR IGG+IGM SER QL: 0.01 (ref 0–0.89)

## 2019-09-30 ENCOUNTER — HEALTH MAINTENANCE LETTER (OUTPATIENT)
Age: 64
End: 2019-09-30

## 2020-03-12 ENCOUNTER — VIRTUAL VISIT (OUTPATIENT)
Dept: FAMILY MEDICINE | Facility: OTHER | Age: 65
End: 2020-03-12

## 2020-03-12 NOTE — PROGRESS NOTES
"Date: 2020 09:15:06  Clinician: Xavi Vaughn  Clinician NPI: [id number]  Patient: Sanket Sepulveda  Patient : 1955  Patient Address: [address] [place] [address]  Patient Phone: [phone]  Visit Protocol: URI  Patient Summary:  Sanket is a 64 year old ( : 1955 ) female who initiated a Visit for COVID-19 (Coronavirus) evaluation and screening. When asked the question \"Please sign me up to receive news, health information and promotions from [employer].\", Sanket responded \"No\".    Sanket states her symptoms started gradually 10-13 days ago. After her symptoms started, they improved and then got worse again.   Her symptoms consist of malaise, a cough, and nasal congestion. She is experiencing mild difficulty breathing with activities but can speak normally in full sentences. Sanket also feels feverish but was unable to measure her temperature.   Symptom details     Nasal secretions: The color of her mucus is yellow and blood-tinged.    Cough: Sanket coughs almost every minute and her cough is more bothersome at night. Phlegm comes into her throat when she coughs. She does not believe her cough is caused by post-nasal drip. The color of the phlegm is green and yellow.      Sanket denies having rhinitis, wheezing, ear pain, sore throat, headache, teeth pain, chills, facial pain or pressure, and myalgias. She also denies taking antibiotic medication for the symptoms, having recent facial or sinus surgery in the past 60 days, and having a sinus infection within the past year.   Precipitating events  She has recently been exposed to someone with influenza. Sanket has not been in close contact with any high risk individuals.   Pertinent COVID-19 (Coronavirus) information  Sanket has not traveled internationally in the last 14 days before the start of her symptoms.   Sanket has not had close contact with a laboratory confirmed positive COVID-19 patient within 14 days of symptom onset.   Pertinent " medical history  Sanket typically gets a yeast infection when she takes antibiotics. She has used fluconazole (Diflucan) to treat previous yeast infections. 2 doses of fluconazole (Diflucan) has typically been needed for symptoms to resolve in the past.  Sanket does not need a return to work/school note.   Weight: 210 lbs   Sanket does not smoke or use smokeless tobacco.   Weight: 210 lbs    MEDICATIONS: No current medications, ALLERGIES: Tylenol-Codeine #3, Xylocaine with Epinephrine, isoniazid, atropine, fentanyl  Clinician Response:  Dear Sanket,  Based on the information provided, you have acute bacterial sinusitis, also known as a sinus infection. Sinus infections are caused by bacteria or a virus and symptoms are almost always identical. The difference between the 2 types of infections is timing.  Sinus infections start as viral infections and symptoms improve on their own in about 7 days. If symptoms have not improved after 7 days or have even worsened, a bacterial infection may have developed.  Based on the information provided, you have viral bronchitis, also known as a chest cold. This is a cough that occurs when a cold or other virus settles into your chest. The cough may be dry or you could notice you are coughing up some phlegm.  It is not unusual for a cough to last 3 weeks or more. Treatment focuses on controlling your symptoms as much as possible while you recover.  Medication information  Because you have a viral infection, antibiotics will not help you get better. Treating a viral infection with antibiotics could actually make you feel worse.  I am prescribing:       Amoxicillin-pot clavulanate 500-125 mg oral tablet. Take 1 tablet by mouth every 8 hours for 10 days. There are no refills with this prescription.      Amoxicillin-pot clavulanate 875-125 mg oral tablet. Take 1 tablet by mouth every 12 hours for 7 days. There are no refills with this prescription.     Yeast infections can be a common side  effect of antibiotics. The most common symptom of a yeast infection is itchiness in and around the vagina. Other signs and symptoms include burning, redness, or a thick, white vaginal discharge that looks like cottage cheese and does not have a bad smell.  Unless you are allergic to the over-the-counter medication(s) below, I recommend using:     A decongestant such as Sudafed PE or store brand.      Guaifenesin + dextromethorphan (Robitussin DM, Mucinex DM, or store brand).    A sinus irrigation kit such as Sinus Rinse, Neti Pot, SinuCleanse, or store brand. Be sure to use sterile or previously boiled water to prevent unwanted infections.     Over-the-counter medications do not require a prescription. Ask the pharmacist if you have any questions.  Self care  The following tips will keep you as comfortable as possible while you recover:     Rest    Drink plenty of water and other liquids    Take a hot shower to loosen congestion    Take a spoonful of honey to reduce your cough     When to seek care  Please be seen in a clinic or urgent care if any of the following occur:     Symptoms do not start to improve after 3 days of treatment    New symptoms develop, or symptoms become worse     It is possible to have an allergic reaction to an antibiotic even if you have not had one in the past. If you notice a new rash, significant swelling, or difficulty breathing, stop taking this medication immediately and go to a clinic or urgent care.  COVID-19 (Coronavirus) General Information  We understand it may be concerning to be ill with symptoms that overlap with COVID-19 (Coronavirus) symptoms. Below are some helpful information on COVID-19 (Coronavirus).  How can I protect myself and others from the COVID-19 (Coronavirus)?  Because there is currently no vaccine to prevent infection, the best way to protect yourself is to avoid being exposed to this virus. The CDC recommends the following additional steps:     Wash your hands  often with soap and water for at least 20 seconds, especially after blowing your nose, coughing, or sneezing; going to the bathroom; and before eating or preparing food.  Use an alcohol-based hand  that contains at least 60 percent alcohol if soap and water are not available.        Avoid touching your eyes, nose and mouth with unwashed hands.    Avoid close contact with people who are sick.    Stay home when you are sick.    Cover your cough or sneeze with a tissue, then throw the tissue in the trash.    Clean and disinfect frequently touched objects and surfaces.     You can help stop COVID-19 (Coronavirus) by knowing the signs and symptoms:     Fever    Cough    Shortness of breath     Contact your healthcare provider if   Develop symptoms   AND   Have been in close contact with a person known to have COVID-19 (Coronavirus) or live in or have recently traveled from an area with ongoing spread of COVID-19 (Coronavirus). Call ahead before you go to a doctor's office or emergency room. Tell them about your recent travel and your symptoms.   For the most up to date information, visit the CDC's website.  Steps to help prevent the spread of COVID-19 (Coronavirus) if you are sick  If you are sick with COVID-19 (Coronavirus) or suspect you are infected with the virus that causes COVID-19 (Coronavirus), follow the steps below to help prevent the disease from spreading&nbsp;to people in your home and community.     Stay home except to get medical care. Home isolation may be started in consultation with your healthcare clinician.    Separate yourself from other people and animals in your home.    Call ahead before visiting your doctor if you have a medical appointment.    Wear a facemask when you are around other people.    Cover your cough and sneezes.    Clean your hands often.    Avoid sharing personal household items.    Clean and disinfect frequently touched objects and surfaces everyday.    You will need to  "have someone drop off medications or household supplies (if needed) at your house without coming inside or in contact with you or others living in your house.    Monitor your symptoms and seek prompt medical care if your illness is worsening (e.g. Difficulty breathing).    Discontinue home isolation only in consultation with your healthcare provider.     For more detailed and up to date information on what to do if you are sick, visit this link: What to Do If You Are Sick With Coronavirus Disease 2019 (COVID-19).  Do I need to be tested for COVID-19 (Coronavirus)?     At this time, the limited number of tests available are controlled by the state and local health departments and are being reserved for more seriously ill patients, those with known exposure to confirmed patients, and those with recent travel (within 14 days) to countries with high rates of COVID-19 (Coronavirus).    Decisions on which patients receive testing will be based on the local spread of COVID-19 (Coronavirus) as well as the symptoms. Your healthcare provider will make the final decision on whether you should be tested.    In the meantime, if you have concerns that you may have been exposed, it is reasonable to practice \"social distancing.\"&nbsp; If you are ill with a cold or flu like illness, please monitor your symptoms and reach out to your healthcare provider if your symptoms worsen.    For more up to date information, visit this link: COVID-19 (Coronavirus) Frequently Asked Questions and Answers.      Diagnosis: Acute bacterial sinusitis  Diagnosis ICD: J01.90  Additional Clinician Notes: Based on the information you have provided, it does not appear you need Coronavirus (COVID-19) testing.     At this time, we recommend testing primarily for those people who have symptoms of cough and fever and have either traveled to a known area of infection or have been exposed to someone with laboratory confirmed Coronavirus by close contact.  "     Coronavirus - General Information:  * The coronavirus infection starts within 14 days of an exposure.  * Symptoms are those of a respiratory infection (such as fever, cough).   * If you have not had symptoms by day 15, you should be considered uninfected by coronavirus.  Prescription: amoxicillin-pot clavulanate 500-125 mg oral tablet 30 tablet, 10 days supply. Take 1 tablet by mouth every 8 hours for 10 days. Refills: 0, Refill as needed: no, Allow substitutions: yes  Prescription: amoxicillin-pot clavulanate 875-125 mg oral tablet 14 tablet, 7 days supply. Take 1 tablet by mouth every 12 hours for 7 days. Refills: 0, Refill as needed: no, Allow substitutions: yes

## 2020-03-15 ENCOUNTER — HEALTH MAINTENANCE LETTER (OUTPATIENT)
Age: 65
End: 2020-03-15

## 2021-01-15 ENCOUNTER — HEALTH MAINTENANCE LETTER (OUTPATIENT)
Age: 66
End: 2021-01-15

## 2021-05-25 ENCOUNTER — RECORDS - HEALTHEAST (OUTPATIENT)
Dept: ADMINISTRATIVE | Facility: CLINIC | Age: 66
End: 2021-05-25

## 2021-05-29 ENCOUNTER — RECORDS - HEALTHEAST (OUTPATIENT)
Dept: ADMINISTRATIVE | Facility: CLINIC | Age: 66
End: 2021-05-29

## 2021-07-13 NOTE — TELEPHONE ENCOUNTER
Patient needing referral to [facility] orthopedics. Pending referral to Dr. Gomez.    Simple: Patient demonstrates quick and easy understanding

## 2021-07-29 ENCOUNTER — THERAPY VISIT (OUTPATIENT)
Dept: PHYSICAL THERAPY | Facility: CLINIC | Age: 66
End: 2021-07-29
Payer: COMMERCIAL

## 2021-07-29 DIAGNOSIS — M76.62 ACHILLES TENDINITIS OF LEFT LOWER EXTREMITY: ICD-10-CM

## 2021-07-29 PROCEDURE — 97161 PT EVAL LOW COMPLEX 20 MIN: CPT | Mod: GP | Performed by: PHYSICAL THERAPIST

## 2021-07-29 PROCEDURE — 97110 THERAPEUTIC EXERCISES: CPT | Mod: GP | Performed by: PHYSICAL THERAPIST

## 2021-07-29 NOTE — PROGRESS NOTES
Physical Therapy Initial Evaluation  Subjective:    Therapist Generated HPI Evaluation  Problem details: Pt presents to PT with a chief complaint of L achilles and heel pain with reported gradual onset ~1 year ago with potential correlation to unsupportive shoes and/or inactivity during Covid restrictions. Primary pain location identified at L achilles but patient does report some radiation into her L calf and posterior knee with occasional L heel numbness. Pt also reports a history of LBP but denies any worsening symptoms during her heel pain episode. L heel pain worse with walking, standing, stairs, and early in the a.m..         Type of problem:  Left ankle.    This is a chronic condition.  Occurance: walking w/ unsupportive shoes.  Where condition occurred: at home.  Patient reports pain:  Posterior (achilles/calcaneus).  Pain is described as burning (occasional heel numbness) and is intermittent.  Pain radiates to:  Lower leg and knee. Pain is worse in the A.M..  Since onset symptoms are gradually worsening.  Associated symptoms:  Loss of motion/stiffness. Symptoms are exacerbated by ascending stairs, descending stairs, standing and walking  and relieved by NSAID's.      Restrictions due to condition include:  Working in normal job without restrictions.  Barriers include:  None as reported by patient.    Patient Health History         Pain is reported as 7/10 on pain scale.  General health as reported by patient is fair.  Pertinent medical history includes: anemia, concussions/dizziness, high blood pressure, menopausal, numbness/tingling, osteoarthritis, overweight and thyroid problems.   Red flags:  None as reported by patient.  Medical allergies: adhesives.   Surgeries include:  Other (, appendectomy, hysterectomy).    Current medications:  None.    Occupation: [employer] and .   Primary job tasks include:  Computer work.                                    Objective:  Standing Alignment:         Lumbar:  Lordosis incr                      Ankle/Foot Evaluation  ROM:      PROM:    Dorsiflexion:  Left:    10     Right:   15   Plantarflexion:  Left:    60     Right:  60  Inversion:  Left:      WNL     Right:   WNL  Eversion: Left:   WNL     Right:  WNL          Strength:    Dorsiflexion:  Left: 5/5     Pain:   Right: 5/5   Pain:  Plantarflexion: Left:  4-/5   Weak/painful  Pain:++   Right: 4+/5  Pain:  Inversion:Left: 5/5  Pain:     Right: 5/5  Pain:  Eversion:Left:  4+/5  Strong/painful  Pain:++  Right: 5/5  Pain:                                 Lumbar/SI Evaluation  ROM:    AROM Lumbar:   Flexion:          WNL  Ext:                    Min loss, LBP   Side Bend:        Left:     Right:   Rotation:           Left:  Min loss, L LBP    Right:  Min loss  Side Glide:        Left:     Right:           Lumbar Myotomes:  normal (Pain + with L Calf raise )                Lumbar Dermtomes:  normal                Neural Tension/Mobility:    Left side:  SLR w/DF and Slump positive.           Spinal Segmental Conclusions:     Level: Hypo noted at L5 and L4                                                   General     ROS    Assessment/Plan:    Patient is a 66 year old female with left side ankle complaints.    Patient has the following significant findings with corresponding treatment plan. Examination reveals s/s of L achilles tendinitis with potential underlying radicular component               Diagnosis 1:  L achilles tendinitis Pain -  manual therapy  Decreased ROM/flexibility - manual therapy and therapeutic exercise  Decreased strength - therapeutic exercise and therapeutic activities  Impaired posture - neuro re-education    Therapy Evaluation Codes:   Cumulative Therapy Evaluation is: Low complexity.    Previous and current functional limitations:  (See Goal Flow Sheet for this information)    Short term and Long term goals: (See Goal Flow Sheet for this information)     Communication ability:  Patient  appears to be able to clearly communicate and understand verbal and written communication and follow directions correctly.  Treatment Explanation - The following has been discussed with the patient:   RX ordered/plan of care  Anticipated outcomes  Possible risks and side effects  This patient would benefit from PT intervention to resume normal activities.   Rehab potential is good.    Frequency:  1 X week, once daily  Duration:  for 8 weeks  Discharge Plan:  Achieve all LTG.  Independent in home treatment program.  Reach maximal therapeutic benefit.    Please refer to the daily flowsheet for treatment today, total treatment time and time spent performing 1:1 timed codes.

## 2021-07-29 NOTE — LETTER
[facility]  [address] [place] [address]  [phone]    2021    Re: Sanket Sepulveda   :   1955  MRN:  [id number]   REFERRING PHYSICIAN:   Anabela TORRES [facility]  Date of Initial Evaluation:  21  Visits:  Rxs Used: 1  Reason for Referral:  Achilles tendinitis of left lower extremity    EVALUATION SUMMARY    Physical Therapy Initial Evaluation  Subjective:    Therapist Generated HPI Evaluation  Problem details: Pt presents to PT with a chief complaint of L achilles and heel pain with reported gradual onset ~1 year ago with potential correlation to unsupportive shoes and/or inactivity during Covid restrictions. Primary pain location identified at L achilles but patient does report some radiation into her L calf and posterior knee with occasional L heel numbness. Pt also reports a history of LBP but denies any worsening symptoms during her heel pain episode. L heel pain worse with walking, standing, stairs, and early in the a.m..         Type of problem:  Left ankle.  This is a chronic condition.  Occurance: walking w/ unsupportive shoes.  Where condition occurred: at home.  Patient reports pain:  Posterior (achilles/calcaneus).  Pain is described as burning (occasional heel numbness) and is intermittent.  Pain radiates to:  Lower leg and knee. Pain is worse in the A.M..  Since onset symptoms are gradually worsening.  Associated symptoms:  Loss of motion/stiffness. Symptoms are exacerbated by ascending stairs, descending stairs, standing and walking  and relieved by NSAID's.  Restrictions due to condition include:  Working in normal job without restrictions.  Barriers include:  None as reported by patient.    Patient Health History  Pain is reported as 7/10 on pain scale.  General health as reported by patient is fair.  Pertinent medical history includes: anemia,  concussions/dizziness, high blood pressure, menopausal, numbness/tingling, osteoarthritis, overweight and thyroid problems.   Red flags:  None as reported by patient.  Medical allergies: adhesives.   Surgeries include:  Other (, appendectomy, hysterectomy).    Current medications:  None.    Occupation: [employer] and .   Primary job tasks include:  Computer work.   Re: Sanket Sepulveda   :   1955               Objective:  Standing Alignment:    Lumbar:  Lordosis incr    Ankle/Foot Evaluation  ROM:      PROM:    Dorsiflexion:  Left:    10     Right:   15   Plantarflexion:  Left:    60     Right:  60  Inversion:  Left:      WNL     Right:   WNL  Eversion: Left:   WNL     Right:  WNL    Strength:    Dorsiflexion:  Left: 5/5     Pain:   Right: 5/5   Pain:  Plantarflexion: Left:  4-/5   Weak/painful  Pain:++   Right: 4+/5  Pain:  Inversion:Left: 5/5  Pain:     Right: 5/5  Pain:  Eversion:Left:  4+/5  Strong/painful  Pain:++  Right: 5/5  Pain:       Lumbar/SI Evaluation  ROM:    AROM Lumbar:   Flexion:          WNL  Ext:                    Min loss, LBP   Side Bend:        Left:     Right:   Rotation:           Left:  Min loss, L LBP    Right:  Min loss  Side Glide:        Left:     Right:         Lumbar Myotomes:  normal (Pain + with L Calf raise )    Lumbar Dermtomes:  normal    Neural Tension/Mobility:    Left side:  SLR w/DF and Slump positive.     Spinal Segmental Conclusions:   Level: Hypo noted at L5 and L4    Assessment/Plan:    Patient is a 66 year old female with left side ankle complaints.    Patient has the following significant findings with corresponding treatment plan. Examination reveals s/s of L achilles tendinitis with potential underlying radicular component               Diagnosis 1:  L achilles tendinitis Pain -  manual therapy  Decreased ROM/flexibility - manual therapy and therapeutic exercise  Decreased strength - therapeutic exercise and therapeutic activities  Impaired  posture - neuro re-education    Therapy Evaluation Codes:   Cumulative Therapy Evaluation is: Low complexity.  Re: Sanket Sepulveda   :   1955    Previous and current functional limitations:  (See Goal Flow Sheet for this information)    Short term and Long term goals: (See Goal Flow Sheet for this information)     Communication ability:  Patient appears to be able to clearly communicate and understand verbal and written communication and follow directions correctly.  Treatment Explanation - The following has been discussed with the patient:   RX ordered/plan of care  Anticipated outcomes  Possible risks and side effects  This patient would benefit from PT intervention to resume normal activities.   Rehab potential is good.    Frequency:  1 X week, once daily  Duration:  for 8 weeks  Discharge Plan:  Achieve all LTG.  Independent in home treatment program.  Reach maximal therapeutic benefit.    Thank you for your referral.    INQUIRIES  Therapist: Liborio Barton, PT, DPT, [facility] [address] [place] [address]  Phone: [phone]  Fax: [phone]

## 2021-07-30 PROBLEM — M76.62 ACHILLES TENDINITIS OF LEFT LOWER EXTREMITY: Status: ACTIVE | Noted: 2021-07-30

## 2021-08-03 ENCOUNTER — THERAPY VISIT (OUTPATIENT)
Dept: PHYSICAL THERAPY | Facility: CLINIC | Age: 66
End: 2021-08-03
Payer: COMMERCIAL

## 2021-08-03 DIAGNOSIS — M76.62 ACHILLES TENDINITIS OF LEFT LOWER EXTREMITY: ICD-10-CM

## 2021-08-03 PROCEDURE — 97110 THERAPEUTIC EXERCISES: CPT | Mod: GP | Performed by: PHYSICAL THERAPIST

## 2021-08-03 PROCEDURE — 97035 APP MDLTY 1+ULTRASOUND EA 15: CPT | Mod: GP | Performed by: PHYSICAL THERAPIST

## 2021-08-03 PROCEDURE — 97140 MANUAL THERAPY 1/> REGIONS: CPT | Mod: GP | Performed by: PHYSICAL THERAPIST

## 2021-08-12 ENCOUNTER — THERAPY VISIT (OUTPATIENT)
Dept: PHYSICAL THERAPY | Facility: CLINIC | Age: 66
End: 2021-08-12
Payer: COMMERCIAL

## 2021-08-12 DIAGNOSIS — M76.62 ACHILLES TENDINITIS OF LEFT LOWER EXTREMITY: ICD-10-CM

## 2021-08-12 PROCEDURE — 97110 THERAPEUTIC EXERCISES: CPT | Mod: GP | Performed by: PHYSICAL THERAPIST

## 2021-08-12 PROCEDURE — 97140 MANUAL THERAPY 1/> REGIONS: CPT | Mod: GP | Performed by: PHYSICAL THERAPIST

## 2021-08-27 ENCOUNTER — THERAPY VISIT (OUTPATIENT)
Dept: PHYSICAL THERAPY | Facility: CLINIC | Age: 66
End: 2021-08-27
Payer: COMMERCIAL

## 2021-08-27 DIAGNOSIS — M76.62 ACHILLES TENDINITIS OF LEFT LOWER EXTREMITY: ICD-10-CM

## 2021-08-27 PROCEDURE — 97035 APP MDLTY 1+ULTRASOUND EA 15: CPT | Mod: GP | Performed by: PHYSICAL THERAPIST

## 2021-08-27 PROCEDURE — 97140 MANUAL THERAPY 1/> REGIONS: CPT | Mod: GP | Performed by: PHYSICAL THERAPIST

## 2021-08-27 PROCEDURE — 97110 THERAPEUTIC EXERCISES: CPT | Mod: GP | Performed by: PHYSICAL THERAPIST

## 2021-09-17 ENCOUNTER — THERAPY VISIT (OUTPATIENT)
Dept: PHYSICAL THERAPY | Facility: CLINIC | Age: 66
End: 2021-09-17
Payer: COMMERCIAL

## 2021-09-17 DIAGNOSIS — M76.62 ACHILLES TENDINITIS OF LEFT LOWER EXTREMITY: ICD-10-CM

## 2021-09-17 PROCEDURE — 97035 APP MDLTY 1+ULTRASOUND EA 15: CPT | Mod: GP | Performed by: PHYSICAL THERAPIST

## 2021-09-17 PROCEDURE — 97110 THERAPEUTIC EXERCISES: CPT | Mod: GP | Performed by: PHYSICAL THERAPIST

## 2021-10-24 ENCOUNTER — HEALTH MAINTENANCE LETTER (OUTPATIENT)
Age: 66
End: 2021-10-24

## 2021-11-02 ENCOUNTER — THERAPY VISIT (OUTPATIENT)
Dept: PHYSICAL THERAPY | Facility: CLINIC | Age: 66
End: 2021-11-02
Payer: COMMERCIAL

## 2021-11-02 DIAGNOSIS — M76.62 ACHILLES TENDINITIS OF LEFT LOWER EXTREMITY: ICD-10-CM

## 2021-11-02 PROCEDURE — 97110 THERAPEUTIC EXERCISES: CPT | Mod: GP | Performed by: PHYSICAL THERAPIST

## 2021-11-02 PROCEDURE — 97140 MANUAL THERAPY 1/> REGIONS: CPT | Mod: GP | Performed by: PHYSICAL THERAPIST

## 2021-11-02 PROCEDURE — 97035 APP MDLTY 1+ULTRASOUND EA 15: CPT | Mod: GP | Performed by: PHYSICAL THERAPIST

## 2021-11-16 ENCOUNTER — THERAPY VISIT (OUTPATIENT)
Dept: PHYSICAL THERAPY | Facility: CLINIC | Age: 66
End: 2021-11-16
Payer: COMMERCIAL

## 2021-11-16 DIAGNOSIS — M76.62 ACHILLES TENDINITIS OF LEFT LOWER EXTREMITY: ICD-10-CM

## 2021-11-16 PROCEDURE — 97110 THERAPEUTIC EXERCISES: CPT | Mod: GP | Performed by: PHYSICAL THERAPIST

## 2021-11-16 PROCEDURE — 97140 MANUAL THERAPY 1/> REGIONS: CPT | Mod: GP | Performed by: PHYSICAL THERAPIST

## 2021-11-16 PROCEDURE — 97035 APP MDLTY 1+ULTRASOUND EA 15: CPT | Mod: GP | Performed by: PHYSICAL THERAPIST

## 2022-02-08 PROBLEM — M76.62 ACHILLES TENDINITIS OF LEFT LOWER EXTREMITY: Status: RESOLVED | Noted: 2021-07-30 | Resolved: 2022-02-08

## 2022-02-13 ENCOUNTER — HEALTH MAINTENANCE LETTER (OUTPATIENT)
Age: 67
End: 2022-02-13

## 2022-06-13 ENCOUNTER — TRANSCRIBE ORDERS (OUTPATIENT)
Dept: OTHER | Age: 67
End: 2022-06-13
Payer: COMMERCIAL

## 2022-06-13 DIAGNOSIS — E04.1 THYROID NODULE: Primary | ICD-10-CM

## 2022-06-15 ENCOUNTER — TELEPHONE (OUTPATIENT)
Dept: ENDOCRINOLOGY | Facility: CLINIC | Age: 67
End: 2022-06-15
Payer: COMMERCIAL

## 2022-06-15 NOTE — TELEPHONE ENCOUNTER
Health Call Center    Phone Message    May a detailed message be left on voicemail: yes     Reason for Call: Other: Patient is being referred to be seen for Thyroid nodules. Ref by Dr. Irene Lugo @ [facility]. Patient had US done on 3/28/22 at [facility] per pt. Patient is scheduled on 9/1/22 at 9:30am with Dr Stubbs. Appointment is outside the 2 week time frame per guidelines. Sending encounter to clinic for review. Please call patient to schedule if sooner opening is needed.     Action Taken: Message routed to:  [facility] ([facility] Endocrinology    Travel Screening: Not Applicable

## 2022-06-16 NOTE — TELEPHONE ENCOUNTER
Endocrine triage  There are no thyroid images or reports on the system or on available care everywhere .  The scheduled first available endocrine appointment timeframe is acceptable.  Sharon Wood MD

## 2022-06-30 NOTE — TELEPHONE ENCOUNTER
RECORDS RECEIVED FROM: Thyroid Nodule, referred by Dr. Irene Lugo from [facility]   DATE RECEIVED: 9/1/2022   NOTES (FOR ALL VISITS) STATUS DETAILS   OFFICE NOTES from referring provider ce  [facility]:  Dr. Lugo     MEDICATION LIST In process    IMAGING        ULTRASOUND (HEAD/NECK) ce [facility]- 3/28/22   LABS     DIABETES: HBGA1C, CREATININE, FASTING LIPIDS, MICROALBUMIN URINE, POTASSIUM, TSH, T4    THYROID: TSH, T4, CBC, THYRODLONULIN, TOTAL T3, FREE T4, CALCITONIN, CEA In process / Internal   [facility]:  8/3/21 - Vitamin D  9/24/20 - Urine Analysis  5/14/20 - CBC  5/14/20 - Thyroglobulin  5/14/20 - Thyroid Peroxidase  5/14/20 - TSH, T4, T3    [facility]:  6/6/18 - BMP  3/31/14 - CMP  3/31/14 - Lipid

## 2022-07-25 ENCOUNTER — TRANSCRIBE ORDERS (OUTPATIENT)
Dept: OTHER | Age: 67
End: 2022-07-25

## 2022-07-25 DIAGNOSIS — M25.569 KNEE PAIN, UNSPECIFIED CHRONICITY, UNSPECIFIED LATERALITY: Primary | ICD-10-CM

## 2022-07-31 ENCOUNTER — HEALTH MAINTENANCE LETTER (OUTPATIENT)
Age: 67
End: 2022-07-31

## 2022-08-02 ENCOUNTER — THERAPY VISIT (OUTPATIENT)
Dept: PHYSICAL THERAPY | Facility: CLINIC | Age: 67
End: 2022-08-02
Attending: FAMILY MEDICINE
Payer: COMMERCIAL

## 2022-08-02 DIAGNOSIS — M54.42 LEFT-SIDED LOW BACK PAIN WITH LEFT-SIDED SCIATICA: ICD-10-CM

## 2022-08-02 DIAGNOSIS — M25.562 LEFT KNEE PAIN: ICD-10-CM

## 2022-08-02 PROCEDURE — 97110 THERAPEUTIC EXERCISES: CPT | Mod: GP | Performed by: PHYSICAL THERAPIST

## 2022-08-02 PROCEDURE — 97161 PT EVAL LOW COMPLEX 20 MIN: CPT | Mod: GP | Performed by: PHYSICAL THERAPIST

## 2022-08-02 NOTE — PROGRESS NOTES
Physical Therapy Initial Evaluation  Subjective:    Therapist Generated HPI Evaluation  Problem details: Pt presents to PT with a chief complaint of L knee pain with reported insidious onset ~5 weeks ago. Primary pain location identified at L anterior knee with secondary radiation into L lateral thigh and lower leg and occasional tingling into her L lateral foot. Pt does report secondary L sided LBP and does feel there is some correlation of her knee pain with her low back. Pain worse with squatting, stairs, and prolonged walking. .         Type of problem:  Left knee (secondary L thigh, lower leg and LBP).    This is a new condition.  Condition occurred with:  Insidious onset.  Where condition occurred: for unknown reasons.  Patient reports pain:  Anterior.  Pain is described as aching and is intermittent.  Pain radiates to:  Thigh, low back, lower leg and foot. Pain is worse in the P.M..  Since onset symptoms are unchanged.  Associated symptoms:  Loss of motion/stiffness, tingling and numbness. Symptoms are exacerbated by ascending stairs, bending/squatting and descending stairs  and relieved by NSAID's.      Restrictions due to condition include:  Working in normal job without restrictions.  Barriers include:  None as reported by patient.    Patient Health History         Pain is reported as 7/10 on pain scale.  General health as reported by patient is good.  Pertinent medical history includes: high blood pressure, overweight, numbness/tingling and thyroid problems.   Red flags:  None as reported by patient.  Medical allergies: adhesives (see chart).       Current medications:  None.    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   Primary job tasks include:  Computer work.                                    Objective:  System         Lumbar/SI Evaluation  ROM:    AROM Lumbar:   Flexion:          WNL  Ext:                    Min loss, pain + L thigh   Side Bend:        Left:     Right:   Rotation:           Left:   Min loss, pain ++    Right:  WNL  Side Glide:        Left:     Right:           Lumbar Myotomes:  normal                Lumbar Dermtomes:  normal                Neural Tension/Mobility:    Left side:  SLR w/DF positive.           Spinal Segmental Conclusions: Pain w/ L4-5 PA mobility testing      Level: noted at L5 and L4                                        Hip Evaluation    Hip Strength:    Flexion:   Left: 5-/5   Pain:  Right: 5/5   Pain:                    Extension:  Left:  3+/5  ++  Pain:weak/painfulRight: 4+/5    Pain:    Abduction:  Left:  3+/5    ++   Pain:weak/painfulRight: 4/5    Pain:  Adduction:  Left: /5   Pain:weak/painful                         Knee Evaluation:  ROM:      PROM      Extension: Left: 0    Right:  0  Flexion: Left: 130*    Right:  135  Pain: Min post knee pain w/ end range L knee flexion                      General     ROS    Assessment/Plan:    Patient is a 67 year old female with lumbar complaints.    Patient has the following significant findings with corresponding treatment plan.                Diagnosis 1:  L sided LBP with L LE radiation  Pain -  manual therapy, splint/taping/bracing/orthotics, self management, education and directional preference exercise  Decreased ROM/flexibility - manual therapy and therapeutic exercise  Decreased strength - therapeutic exercise and therapeutic activities  Impaired muscle performance - neuro re-education  Impaired posture - neuro re-education    Therapy Evaluation Codes:   Cumulative Therapy Evaluation is: Low complexity.    Previous and current functional limitations:  (See Goal Flow Sheet for this information)    Short term and Long term goals: (See Goal Flow Sheet for this information)     Communication ability:  Patient appears to be able to clearly communicate and understand verbal and written communication and follow directions correctly.  Treatment Explanation - The following has been discussed with the patient:   RX ordered/plan of  care  Anticipated outcomes  Possible risks and side effects  This patient would benefit from PT intervention to resume normal activities.   Rehab potential is good.    Frequency:  1 X week, once daily  Duration:  for 8 weeks  Discharge Plan:  Achieve all LTG.  Independent in home treatment program.  Reach maximal therapeutic benefit.    Please refer to the daily flowsheet for treatment today, total treatment time and time spent performing 1:1 timed codes.

## 2022-08-03 PROBLEM — M54.42 LEFT-SIDED LOW BACK PAIN WITH LEFT-SIDED SCIATICA: Status: ACTIVE | Noted: 2022-08-03

## 2022-08-03 PROBLEM — M25.562 LEFT KNEE PAIN: Status: ACTIVE | Noted: 2022-08-03

## 2022-08-03 ASSESSMENT — ACTIVITIES OF DAILY LIVING (ADL)
SWELLING: THE SYMPTOM AFFECTS MY ACTIVITY SLIGHTLY
KNEE_ACTIVITY_OF_DAILY_LIVING_SUM: 38
PAIN: THE SYMPTOM AFFECTS MY ACTIVITY MODERATELY
GIVING WAY, BUCKLING OR SHIFTING OF KNEE: THE SYMPTOM AFFECTS MY ACTIVITY SLIGHTLY
RISE FROM A CHAIR: ACTIVITY IS SOMEWHAT DIFFICULT
GO UP STAIRS: ACTIVITY IS SOMEWHAT DIFFICULT
STIFFNESS: THE SYMPTOM AFFECTS MY ACTIVITY MODERATELY
SQUAT: ACTIVITY IS FAIRLY DIFFICULT
KNEEL ON THE FRONT OF YOUR KNEE: ACTIVITY IS SOMEWHAT DIFFICULT
LIMPING: THE SYMPTOM AFFECTS MY ACTIVITY MODERATELY
KNEE_ACTIVITY_OF_DAILY_LIVING_SCORE: 54.29
AS_A_RESULT_OF_YOUR_KNEE_INJURY,_HOW_WOULD_YOU_RATE_YOUR_CURRENT_LEVEL_OF_DAILY_ACTIVITY?: ABNORMAL
STAND: ACTIVITY IS SOMEWHAT DIFFICULT
HOW_WOULD_YOU_RATE_THE_OVERALL_FUNCTION_OF_YOUR_KNEE_DURING_YOUR_USUAL_DAILY_ACTIVITIES?: ABNORMAL
WALK: ACTIVITY IS SOMEWHAT DIFFICULT
GO DOWN STAIRS: ACTIVITY IS SOMEWHAT DIFFICULT
HOW_WOULD_YOU_RATE_THE_CURRENT_FUNCTION_OF_YOUR_KNEE_DURING_YOUR_USUAL_DAILY_ACTIVITIES_ON_A_SCALE_FROM_0_TO_100_WITH_100_BEING_YOUR_LEVEL_OF_KNEE_FUNCTION_PRIOR_TO_YOUR_INJURY_AND_0_BEING_THE_INABILITY_TO_PERFORM_ANY_OF_YOUR_USUAL_DAILY_ACTIVITIES?: 80
RAW_SCORE: 38
SIT WITH YOUR KNEE BENT: ACTIVITY IS MINIMALLY DIFFICULT
WEAKNESS: THE SYMPTOM AFFECTS MY ACTIVITY MODERATELY

## 2022-08-10 ENCOUNTER — THERAPY VISIT (OUTPATIENT)
Dept: PHYSICAL THERAPY | Facility: CLINIC | Age: 67
End: 2022-08-10
Payer: COMMERCIAL

## 2022-08-10 DIAGNOSIS — M25.562 LEFT KNEE PAIN: Primary | ICD-10-CM

## 2022-08-10 DIAGNOSIS — M54.42 LEFT-SIDED LOW BACK PAIN WITH LEFT-SIDED SCIATICA: ICD-10-CM

## 2022-08-10 PROCEDURE — 97110 THERAPEUTIC EXERCISES: CPT | Mod: GP | Performed by: PHYSICAL THERAPIST

## 2022-08-10 PROCEDURE — 97112 NEUROMUSCULAR REEDUCATION: CPT | Mod: GP | Performed by: PHYSICAL THERAPIST

## 2022-08-10 PROCEDURE — 97140 MANUAL THERAPY 1/> REGIONS: CPT | Mod: GP | Performed by: PHYSICAL THERAPIST

## 2022-08-23 ENCOUNTER — THERAPY VISIT (OUTPATIENT)
Dept: PHYSICAL THERAPY | Facility: CLINIC | Age: 67
End: 2022-08-23
Payer: COMMERCIAL

## 2022-08-23 DIAGNOSIS — M54.42 LEFT-SIDED LOW BACK PAIN WITH LEFT-SIDED SCIATICA: ICD-10-CM

## 2022-08-23 DIAGNOSIS — M25.562 LEFT KNEE PAIN: Primary | ICD-10-CM

## 2022-08-23 PROCEDURE — 97110 THERAPEUTIC EXERCISES: CPT | Mod: GP | Performed by: PHYSICAL THERAPIST

## 2022-08-23 PROCEDURE — 97112 NEUROMUSCULAR REEDUCATION: CPT | Mod: GP | Performed by: PHYSICAL THERAPIST

## 2022-08-29 NOTE — PROGRESS NOTES
Reached out via phone to the pt on 8/29 to remind them of their visit with Dr. Stubbs.  Christelle Petersen

## 2022-08-31 NOTE — PROGRESS NOTES
Subjective:    New patient, no prior Endocrinology notes including Care Everywhere    Sanket Sepulveda is a 67 year old female who presents for thyroid nodules.     Carotid US 2/10/2022 ([place]) led to an incidental finding of thyroid nodules.     OSH thyroid US ([place]) 3/28/2022: this is the only thyroid US she has had, we don't have the images but per OSH radiology  -Right lobe of the thyroid measures 4.4 x 1.7 x 1.8 cm. Parenchyma is homogeneous with normal blood flow.     Nodule 1:   Size: 1.6 x 1.6 x 0.9 cm   Location: Superior   Composition: Solid or almost completely solid - 2   Echogenicity: isoechoic - 1   Shape:Wider than Tall - 0   Margins: Smooth - 0   Echogenic foci:None - 0     ACR TIRADs total points:3   ACR TIRADs risk category: TR3 - 3     Nodule 2   Size: 0.4 x 0.4 x 0.3 cm   Location: Superior   Composition: Solid or almost completely solid - 2   Echogenicity: Hypoechoic - 2   Shape:Wider than Tall - 0   Margins: Smooth - 0   Echogenic foci:None - 0     ACR TIRADs total points:4   ACR TIRADs risk category: TR4 - 4-6     Nodule 3   Size: 3.3 x 0.3 x 0.5 cm   Location: Middle   Composition: Cystic or completely cystic - 0   Echogenicity: Anechoic - 0   Shape:Wider than Tall - 0   Margins: Smooth - 0   Echogenic foci:None - 0     ACR TIRADs total points:0   ACR TIRADs risk category: TR1 - 0     Left lobe of the thyroid measures 5.1 x 1.9 x 1.5 cm. Parenchyma is homogeneous with normal blood flow.     Nodule 1   Size: 1.1 x 1.6 x 0.9 cm cm   Location: Inferior   Composition: Solid or almost completely solid - 2   Echogenicity: Hypoechoic - 2   Shape:Wider than Tall - 0   Margins: Smooth - 0   Echogenic foci:None - 0   Presence of internal vascularity.     ACR TIRADs total points:4   ACR TIRADs risk category: TR4 - 4-6     Nodule 2   Size: 1.2 x 1.1 x 1.2 cm   Location: Superior   Composition: Mixed cystic and solid - 1   Echogenicity: Hyper - or isoechoic - 1   Shape:Wider than Tall - 0    Margins: Smooth - 0   Echogenic foci:None - 0     ACR TIRADs total points:2   ACR TIRADs risk category: TR2- 2     Impression from OSH radiologist:   1a. Superior right thyroid nodule #1: solid, isoechoic, and wider than tall, measuring up to 1.6 cm. TI-RADS 3. Recommend follow-up.   1b. Right thyroid nodules 2 and 3 do not require additional followup based on TI-RADS criteria.   2a. Inferior left thyroid nodule  #1: solid, hypoechoic, and wider than tall, measuring up to 1.6 cm. TI-RADS 4. Recommend FNA.   2b. Left thyroid nodule 2 does not require additional follow-up based on TI-RADS.      No overt dysphagia but she has a pressure sensation with swallowing - this does not bother her to the point of wanting an intervention. No SOB. Intermittent hoarseness. ENT performed laryngoscopy 11/6/2020 at [place] and per their note the vocal cord motion was normal. Since her laryngoscopy her hoarseness has not changed or worsened.     No FH of thyroid cancer, a distant cousin had a thyroidectomy - indication not known. No prior H/N radiation.      TSH always normal previously, most recent assay 4/2022 with normal: TSH, total T3, free T3, free T4. Tg Ab undetectable in 2020. TPO detectable but in the normal range in 2020. She has never been on thyroid hormone.    No prior thyroid FNA. She takes biotin but didn't start it until after her 4/2022 blood draw.     Objective:    Appears well. No thyroid eye disease.    Thyroid examined and there is bilateral nodularity, right> left, with an ~2 cm mobile and non tender right lobe nodule.    Radial pulse with a regular rate and rhythm.    Assessment/Plan:    # Thyroid nodules    We reviewed the natural history of thryoid nodules in detail. The next step is to obtain the images from her outside hospital thyroid ultrasound, which we will do.  After I review these images we will decide if any nodules require FNA.  We can also consider if we want to aspirate the cyst.  I will  follow-up with her when the ultrasound is back.    BP was quite elevated today, she has white coat hypertension. She routinely checks her BP at home and it's around 130 - 140 systolic, she will monitor this.     46 minutes spent on the date of the encounter doing chart review, history and exam, documentation and further activities as noted above.

## 2022-09-01 ENCOUNTER — OFFICE VISIT (OUTPATIENT)
Dept: ENDOCRINOLOGY | Facility: CLINIC | Age: 67
End: 2022-09-01
Attending: FAMILY MEDICINE
Payer: COMMERCIAL

## 2022-09-01 ENCOUNTER — PRE VISIT (OUTPATIENT)
Dept: ENDOCRINOLOGY | Facility: CLINIC | Age: 67
End: 2022-09-01

## 2022-09-01 VITALS
WEIGHT: 216.6 LBS | SYSTOLIC BLOOD PRESSURE: 172 MMHG | HEART RATE: 75 BPM | DIASTOLIC BLOOD PRESSURE: 118 MMHG | BODY MASS INDEX: 37.18 KG/M2

## 2022-09-01 DIAGNOSIS — R49.0 HOARSENESS: ICD-10-CM

## 2022-09-01 DIAGNOSIS — R09.A2 GLOBUS SENSATION: ICD-10-CM

## 2022-09-01 DIAGNOSIS — I10 HYPERTENSION, UNSPECIFIED TYPE: ICD-10-CM

## 2022-09-01 DIAGNOSIS — E04.1 THYROID NODULE: ICD-10-CM

## 2022-09-01 PROCEDURE — 99204 OFFICE O/P NEW MOD 45 MIN: CPT | Performed by: INTERNAL MEDICINE

## 2022-09-01 NOTE — LETTER
9/1/2022       RE: Sanket Sepulveda  [address]  [place] [address]     Dear Colleague,    Thank you for referring your patient, Sanket Sepulveda, to the [facility] [place] at [facility]. Please see a copy of my visit note below.    Reached out via phone to the pt on 8/29 to remind them of their visit with Dr. Stubbs.  Christelle Petersen         Subjective:    New patient, no prior Endocrinology notes including Care Everywhere    Sanket Sepulveda is a 67 year old female who presents for thyroid nodules.     Carotid US 2/10/2022 ([place]) led to an incidental finding of thyroid nodules.     OSH thyroid US ([place]) 3/28/2022: this is the only thyroid US she has had, we don't have the images but per OSH radiology  -Right lobe of the thyroid measures 4.4 x 1.7 x 1.8 cm. Parenchyma is homogeneous with normal blood flow.     Nodule 1:   Size: 1.6 x 1.6 x 0.9 cm   Location: Superior   Composition: Solid or almost completely solid - 2   Echogenicity: isoechoic - 1   Shape:Wider than Tall - 0   Margins: Smooth - 0   Echogenic foci:None - 0     ACR TIRADs total points:3   ACR TIRADs risk category: TR3 - 3     Nodule 2   Size: 0.4 x 0.4 x 0.3 cm   Location: Superior   Composition: Solid or almost completely solid - 2   Echogenicity: Hypoechoic - 2   Shape:Wider than Tall - 0   Margins: Smooth - 0   Echogenic foci:None - 0     ACR TIRADs total points:4   ACR TIRADs risk category: TR4 - 4-6     Nodule 3   Size: 3.3 x 0.3 x 0.5 cm   Location: Middle   Composition: Cystic or completely cystic - 0   Echogenicity: Anechoic - 0   Shape:Wider than Tall - 0   Margins: Smooth - 0   Echogenic foci:None - 0     ACR TIRADs total points:0   ACR TIRADs risk category: TR1 - 0     Left lobe of the thyroid measures 5.1 x 1.9 x 1.5 cm. Parenchyma is homogeneous with normal blood flow.     Nodule 1   Size: 1.1 x 1.6 x 0.9 cm cm   Location: Inferior    Composition: Solid or almost completely solid - 2   Echogenicity: Hypoechoic - 2   Shape:Wider than Tall - 0   Margins: Smooth - 0   Echogenic foci:None - 0   Presence of internal vascularity.     ACR TIRADs total points:4   ACR TIRADs risk category: TR4 - 4-6     Nodule 2   Size: 1.2 x 1.1 x 1.2 cm   Location: Superior   Composition: Mixed cystic and solid - 1   Echogenicity: Hyper - or isoechoic - 1   Shape:Wider than Tall - 0   Margins: Smooth - 0   Echogenic foci:None - 0     ACR TIRADs total points:2   ACR TIRADs risk category: TR2- 2     Impression from OSH radiologist:   1a. Superior right thyroid nodule #1: solid, isoechoic, and wider than tall, measuring up to 1.6 cm. TI-RADS 3. Recommend follow-up.   1b. Right thyroid nodules 2 and 3 do not require additional followup based on TI-RADS criteria.   2a. Inferior left thyroid nodule  #1: solid, hypoechoic, and wider than tall, measuring up to 1.6 cm. TI-RADS 4. Recommend FNA.   2b. Left thyroid nodule 2 does not require additional follow-up based on TI-RADS.      No overt dysphagia but she has a pressure sensation with swallowing - this does not bother her to the point of wanting an intervention. No SOB. Intermittent hoarseness. ENT performed laryngoscopy 11/6/2020 at [place] and per their note the vocal cord motion was normal. Since her laryngoscopy her hoarseness has not changed or worsened.     No FH of thyroid cancer, a distant cousin had a thyroidectomy - indication not known. No prior H/N radiation.      TSH always normal previously, most recent assay 4/2022 with normal: TSH, total T3, free T3, free T4. Tg Ab undetectable in 2020. TPO detectable but in the normal range in 2020. She has never been on thyroid hormone.    No prior thyroid FNA. She takes biotin but didn't start it until after her 4/2022 blood draw.     Objective:    Appears well. No thyroid eye disease.    Thyroid examined and there is bilateral nodularity, right> left, with an ~2 cm  mobile and non tender right lobe nodule.    Radial pulse with a regular rate and rhythm.    Assessment/Plan:    # Thyroid nodules    We reviewed the natural history of thryoid nodules in detail. The next step is to obtain the images from her outside hospital thyroid ultrasound, which we will do.  After I review these images we will decide if any nodules require FNA.  We can also consider if we want to aspirate the cyst.  I will follow-up with her when the ultrasound is back.    BP was quite elevated today, she has white coat hypertension. She routinely checks her BP at home and it's around 130 - 140 systolic, she will monitor this.     46 minutes spent on the date of the encounter doing chart review, history and exam, documentation and further activities as noted above.       Adam Stubbs MD

## 2022-09-01 NOTE — NURSING NOTE
Chief Complaint   Patient presents with     Endocrine Problem     Blood pressure (!) 172/118, pulse 75, weight 98.2 kg (216 lb 9.6 oz).    Christelle Petersen

## 2022-10-15 ENCOUNTER — HEALTH MAINTENANCE LETTER (OUTPATIENT)
Age: 67
End: 2022-10-15

## 2022-10-27 ENCOUNTER — THERAPY VISIT (OUTPATIENT)
Dept: PHYSICAL THERAPY | Facility: CLINIC | Age: 67
End: 2022-10-27
Payer: COMMERCIAL

## 2022-10-27 DIAGNOSIS — M54.42 LEFT-SIDED LOW BACK PAIN WITH LEFT-SIDED SCIATICA: ICD-10-CM

## 2022-10-27 DIAGNOSIS — M25.562 LEFT KNEE PAIN: Primary | ICD-10-CM

## 2022-10-27 PROCEDURE — 97110 THERAPEUTIC EXERCISES: CPT | Mod: GP | Performed by: PHYSICAL THERAPIST

## 2022-10-27 PROCEDURE — 97112 NEUROMUSCULAR REEDUCATION: CPT | Mod: GP | Performed by: PHYSICAL THERAPIST

## 2022-10-31 DIAGNOSIS — E04.1 THYROID NODULE: Primary | ICD-10-CM

## 2022-11-15 ENCOUNTER — ANCILLARY PROCEDURE (OUTPATIENT)
Dept: ULTRASOUND IMAGING | Facility: CLINIC | Age: 67
End: 2022-11-15
Attending: INTERNAL MEDICINE
Payer: COMMERCIAL

## 2022-11-15 DIAGNOSIS — E04.1 THYROID NODULE: ICD-10-CM

## 2022-11-15 PROCEDURE — 88172 CYTP DX EVAL FNA 1ST EA SITE: CPT | Mod: 26 | Performed by: STUDENT IN AN ORGANIZED HEALTH CARE EDUCATION/TRAINING PROGRAM

## 2022-11-15 PROCEDURE — 88173 CYTOPATH EVAL FNA REPORT: CPT | Mod: 26 | Performed by: STUDENT IN AN ORGANIZED HEALTH CARE EDUCATION/TRAINING PROGRAM

## 2022-11-15 PROCEDURE — 88173 CYTOPATH EVAL FNA REPORT: CPT | Mod: TC | Performed by: INTERNAL MEDICINE

## 2022-11-15 PROCEDURE — 10005 FNA BX W/US GDN 1ST LES: CPT | Mod: GC | Performed by: RADIOLOGY

## 2022-11-15 RX ORDER — LIDOCAINE HYDROCHLORIDE 10 MG/ML
5 INJECTION, SOLUTION INFILTRATION; PERINEURAL ONCE
Status: COMPLETED | OUTPATIENT
Start: 2022-11-15 | End: 2022-11-15

## 2022-11-15 RX ADMIN — LIDOCAINE HYDROCHLORIDE 3 ML: 10 INJECTION, SOLUTION INFILTRATION; PERINEURAL at 10:32

## 2022-11-15 NOTE — DISCHARGE INSTRUCTIONS
Directions for after your Thyroid Fine Needle Aspiration:    You can remove your bandage within a few hours.  The site of the biopsy may be sore for a day or two after the procedure. You can take over-the-counter pain medicine if needed.  Notify your doctor if you have any of the following:  Fever above 101 degrees  Swelling in the area of the biopsy  Redness or leaking from the biopsy site  Your doctor will notify you of the results in 2-3 days.

## 2022-11-16 LAB
PATH REPORT.COMMENTS IMP SPEC: NORMAL
PATH REPORT.COMMENTS IMP SPEC: NORMAL
PATH REPORT.FINAL DX SPEC: NORMAL
PATH REPORT.GROSS SPEC: NORMAL
PATH REPORT.MICROSCOPIC SPEC OTHER STN: NORMAL
PATH REPORT.RELEVANT HX SPEC: NORMAL

## 2022-11-30 ENCOUNTER — THERAPY VISIT (OUTPATIENT)
Dept: PHYSICAL THERAPY | Facility: CLINIC | Age: 67
End: 2022-11-30
Payer: COMMERCIAL

## 2022-11-30 DIAGNOSIS — M25.562 LEFT KNEE PAIN: Primary | ICD-10-CM

## 2022-11-30 DIAGNOSIS — M54.42 LEFT-SIDED LOW BACK PAIN WITH LEFT-SIDED SCIATICA: ICD-10-CM

## 2022-11-30 PROCEDURE — 97112 NEUROMUSCULAR REEDUCATION: CPT | Mod: GP | Performed by: PHYSICAL THERAPIST

## 2022-11-30 PROCEDURE — 97110 THERAPEUTIC EXERCISES: CPT | Mod: GP | Performed by: PHYSICAL THERAPIST

## 2022-11-30 PROCEDURE — 97140 MANUAL THERAPY 1/> REGIONS: CPT | Mod: GP | Performed by: PHYSICAL THERAPIST

## 2023-04-25 PROBLEM — M25.562 LEFT KNEE PAIN: Status: RESOLVED | Noted: 2022-08-03 | Resolved: 2023-04-25

## 2023-04-25 PROBLEM — M54.42 LEFT-SIDED LOW BACK PAIN WITH LEFT-SIDED SCIATICA: Status: RESOLVED | Noted: 2022-08-03 | Resolved: 2023-04-25

## 2023-08-20 ENCOUNTER — HEALTH MAINTENANCE LETTER (OUTPATIENT)
Age: 68
End: 2023-08-20

## 2023-10-29 ENCOUNTER — HEALTH MAINTENANCE LETTER (OUTPATIENT)
Age: 68
End: 2023-10-29

## 2024-10-13 ENCOUNTER — HEALTH MAINTENANCE LETTER (OUTPATIENT)
Age: 69
End: 2024-10-13

## 2025-01-13 DIAGNOSIS — E04.1 THYROID NODULE: Primary | ICD-10-CM

## 2025-01-28 ENCOUNTER — LAB (OUTPATIENT)
Dept: LAB | Facility: CLINIC | Age: 70
End: 2025-01-28
Payer: COMMERCIAL

## 2025-01-28 DIAGNOSIS — E04.1 THYROID NODULE: ICD-10-CM

## 2025-01-28 PROCEDURE — 36415 COLL VENOUS BLD VENIPUNCTURE: CPT

## 2025-01-28 PROCEDURE — 84443 ASSAY THYROID STIM HORMONE: CPT

## 2025-01-29 LAB — TSH SERPL DL<=0.005 MIU/L-ACNC: 2.18 UIU/ML (ref 0.3–4.2)

## 2025-02-10 NOTE — PROGRESS NOTES
"Subjective:    Established patient.     Sanket Sepulveda is a 69 year old female who presents for thyroid nodules. Chart fully reviewed for thyroid nodules but not osteopenia.     Carotid US 2/10/2022 ([place]) led to an incidental finding of thyroid nodules.     No overt dysphagia but she has a pressure sensation with swallowing. No SOB. Intermittent hoarseness. ENT performed laryngoscopy 11/6/2020 at [place] and per their note the vocal cord motion was normal. Since her laryngoscopy her hoarseness has not changed or worsened.     No FH of thyroid cancer, a distant cousin had a thyroidectomy - indication not known. No prior H/N radiation.      OS thyroid US ([place]) 3/28/2022: this is the first thyroid US she has had  -Per my message 10/31/2022 \"I have reviewed the ultrasound images in detail. None of the right lobe nodules require biopsy. The right lobe nodule labeled \"3.3\" actually refers to mm not cm, so it's a very small nodule. Regarding the left thyroid lobe nodules, I do recommend FNA biopsy of the 1.6 cm nodule in the lower left lobe\"    Right lobe of the thyroid measures 4.4 x 1.7 x 1.8 cm.     Nodule 1:   Size: 1.6 x 1.6 x 0.9 cm   Location: Superior   TR-3    Nodule 2   Size: 0.4 x 0.4 x 0.3 cm   Location: Superior   TR-4    Nodule 3   Size: 3.3 (see my note above, this is mm not cm) x 0.3 x 0.5 cm   TR-1    Left lobe of the thyroid measures 5.1 x 1.9 x 1.5 cm.     Nodule 1   Size: 1.1 x 1.6 x 0.9 cm cm   Location: Inferior   TR-4    Nodule 2   Size: 1.2 x 1.1 x 1.2 cm   TR-2    TSH always normal previously, 1/28/2025: TSH 2.18. 4/2022 with normal: TSH, total T3, free T3, free T4. Tg Ab undetectable in 2020. TPO detectable but in the normal range in 2020. She has never been on thyroid hormone.    Thyroid FNA 11/15/2022:  -Thyroid left inferior 1.1 cm nodule - benign     OSH Thyroid US 12/19/2023 - images not available but per report:     Right lobe of the thyroid measures 5.1 x 2.0 x 1.9 cm. " Parenchyma is homogeneous with normal blood flow.     Nodule 1   Size: 0.6 x 0.6 x 0.2 cm   Location: Superior   TR-4    Nodule 2   Size: 0.5 x 0.4 x 0.2 cm   Location: Mid   TR-1    Nodule 3   Size: 1.1 x 1.5 x 0.9 cm   Location: Middle   TR-4    Left lobe of the thyroid measures 5.2 x 1.7 x 1.9 cm. Parenchyma is homogeneous with normal blood flow.     Nodule 1   Size: 0.7 x 0.8 x 0.3 cm   Location: Superior   TR-4    Nodule 2   Size: 1.3 x 1.3 x 1.2 cm   Location: Middle   TR-3    Nodule 3   Size: 0.3 x 0.5 x 0.3 cm   Location: Middle   TR-4    Nodule 4   Size: 1.2 x 1.3 x 0.9 cm   Location: Inferior   TR-4    Thyroid US 1/23/2025: I reviewed the images in detail   RIGHT lobe: 5.9 x 2.1 x 1.9 cm. Homogeneous echotexture.  Isthmus: 4 mm.  LEFT lobe: 6.1 x 1.9 x 2.0 cm. Homogeneous echotexture.     NECK: No cervical lymphadenopathy.     NODULES:     Nodule 1: Right mid gland posteriorly measuring 19 x 14 x 10 mm; this had measured 11 x 15 x 9 mm previously. I agree it's TR-4    Nodule 2: Left mid gland measuring 13 x 13 x 12 mm; this is unchanged in size and appearance since the prior study. I agree it's TR-3    Nodule 3: Left inferior gland measuring 15 x 14 x 9 mm; this had measured 13 x 12 x 9 mm previously. I agree it's TR-4 (previously FNA'd and benign)     # Osteopenia    OS DEXA 12/18/2024 - images not available but per report her lowest overall T-score is -2.1 at the left femoral neck and FRAX score is 10.8%/1.9%.     No fractures.    Follow-up with her PCP regarding this.     # Weight management    She works with a weight management clinic and is being treated with metformin.     Objective:    BMI 38 kg/m2, /96 (she has white coat hypertension and at home BP is typically 130s/80s). No thyroid eye disease. Thyroid examined and there is bilateral nodularity, right > left, with an ~2 cm right lobe nodule. No cervical LAD.     9/2022: Appears well. No thyroid eye disease. Thyroid examined and there is  bilateral nodularity, right> left, with an ~2 cm mobile and non tender right lobe nodule. Radial pulse with a regular rate and rhythm.    Assessment/Plan:    # Thyroid nodules    Reviewed that the 1.9 cm right lobe nodule meets criteria for FNA, but Sanket has a preference for observation. Repeat US and TSH in 1 year with return visit. She'll let me know in the interim if she perceives nodule growth or develops cervical lymphadenopathy.     # Weight management    She is looking for a new weight management clinic, referral placed.     30 minutes spent on the date of the encounter doing chart review, history and exam, documentation and further activities as noted above.     The longitudinal plan of care for the diagnosis(es)/condition(s) as documented were addressed during this visit. Due to the added complexity in care, I will continue to support Sanket in the subsequent management and with ongoing continuity of care.

## 2025-02-11 ENCOUNTER — OFFICE VISIT (OUTPATIENT)
Dept: ENDOCRINOLOGY | Facility: CLINIC | Age: 70
End: 2025-02-11
Payer: COMMERCIAL

## 2025-02-11 VITALS
BODY MASS INDEX: 38.19 KG/M2 | HEART RATE: 84 BPM | SYSTOLIC BLOOD PRESSURE: 140 MMHG | WEIGHT: 222.5 LBS | DIASTOLIC BLOOD PRESSURE: 98 MMHG

## 2025-02-11 DIAGNOSIS — E66.812 CLASS 2 SEVERE OBESITY DUE TO EXCESS CALORIES WITH SERIOUS COMORBIDITY AND BODY MASS INDEX (BMI) OF 38.0 TO 38.9 IN ADULT (H): ICD-10-CM

## 2025-02-11 DIAGNOSIS — E04.1 THYROID NODULE: Primary | ICD-10-CM

## 2025-02-11 DIAGNOSIS — E66.01 CLASS 2 SEVERE OBESITY DUE TO EXCESS CALORIES WITH SERIOUS COMORBIDITY AND BODY MASS INDEX (BMI) OF 38.0 TO 38.9 IN ADULT (H): ICD-10-CM

## 2025-02-11 RX ORDER — ESTRADIOL 0.1 MG/G
CREAM VAGINAL WEEKLY
COMMUNITY
Start: 2024-10-08

## 2025-02-11 RX ORDER — METFORMIN HYDROCHLORIDE 500 MG/1
TABLET, EXTENDED RELEASE ORAL
COMMUNITY
Start: 2024-11-15

## 2025-02-11 RX ORDER — TRIAMCINOLONE ACETONIDE 1 MG/G
CREAM TOPICAL
COMMUNITY
Start: 2023-04-19

## 2025-07-03 ENCOUNTER — OFFICE VISIT (OUTPATIENT)
Dept: URGENT CARE | Facility: URGENT CARE | Age: 70
End: 2025-07-03
Payer: COMMERCIAL

## 2025-07-03 VITALS
SYSTOLIC BLOOD PRESSURE: 160 MMHG | DIASTOLIC BLOOD PRESSURE: 104 MMHG | BODY MASS INDEX: 38.96 KG/M2 | OXYGEN SATURATION: 100 % | HEART RATE: 77 BPM | TEMPERATURE: 98.6 F | WEIGHT: 227 LBS | RESPIRATION RATE: 16 BRPM

## 2025-07-03 DIAGNOSIS — S70.12XA: Primary | ICD-10-CM
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
## 2025-07-15 ENCOUNTER — THERAPY VISIT (OUTPATIENT)
Dept: PHYSICAL THERAPY | Facility: CLINIC | Age: 70
End: 2025-07-15
Attending: PHYSICIAN ASSISTANT
Payer: COMMERCIAL

## 2025-07-15 DIAGNOSIS — S76.312A HAMSTRING STRAIN, LEFT, INITIAL ENCOUNTER: ICD-10-CM
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
## 2025-07-15 PROCEDURE — 97161 PT EVAL LOW COMPLEX 20 MIN: CPT | Mod: GP | Performed by: PHYSICAL THERAPIST

## 2025-07-15 PROCEDURE — 97110 THERAPEUTIC EXERCISES: CPT | Mod: GP | Performed by: PHYSICAL THERAPIST

## 2025-07-15 ASSESSMENT — ACTIVITIES OF DAILY LIVING (ADL)
STIFFNESS: THE SYMPTOM AFFECTS MY ACTIVITY MODERATELY
WALKING_APPROXIMATELY_10_MINUTES: SLIGHT DIFFICULTY
SITTING FOR 15 MINUTES: MODERATE DIFFICULTY
KNEE_ACTIVITY_OF_DAILY_LIVING_SCORE: 64.29
CUTTING/LATERAL_MOVEMENTS: MODERATE DIFFICULTY
WEAKNESS: THE SYMPTOM AFFECTS MY ACTIVITY MODERATELY
KNEEL ON THE FRONT OF YOUR KNEE: ACTIVITY IS SOMEWHAT DIFFICULT
PLEASE_INDICATE_YOR_PRIMARY_REASON_FOR_REFERRAL_TO_THERAPY:: HIP
WALKING_DOWN_STEEP_HILLS: MODERATE DIFFICULTY
LIGHT_TO_MODERATE_WORK: MODERATE DIFFICULTY
SPORTS_COUNT: 9
PUTTING_ON_SOCKS_AND_SHOES: SLIGHT DIFFICULTY
KNEEL ON THE FRONT OF YOUR KNEE: ACTIVITY IS SOMEWHAT DIFFICULT
GO DOWN STAIRS: ACTIVITY IS SOMEWHAT DIFFICULT
GO DOWN STAIRS: ACTIVITY IS SOMEWHAT DIFFICULT
GIVING WAY, BUCKLING OR SHIFTING OF KNEE: THE SYMPTOM AFFECTS MY ACTIVITY SLIGHTLY
RECREATIONAL ACTIVITIES: MODERATE DIFFICULTY
GOING UP 1 FLIGHT OF STAIRS: MODERATE DIFFICULTY
RISE FROM A CHAIR: ACTIVITY IS MINIMALLY DIFFICULT
SQUAT: ACTIVITY IS SOMEWHAT DIFFICULT
LIMPING: I HAVE THE SYMPTOM BUT IT DOES NOT AFFECT MY ACTIVITY
GETTING_INTO_AND_OUT_OF_AN_AVERAGE_CAR: SLIGHT DIFFICULTY
HOS_ADL_SCORE(%): 57.35
WALKING_INITIALLY: MODERATE DIFFICULTY
STAND: ACTIVITY IS MINIMALLY DIFFICULT
WALK: ACTIVITY IS MINIMALLY DIFFICULT
GOING DOWN 1 FLIGHT OF STAIRS: SLIGHT DIFFICULTY
STIFFNESS: THE SYMPTOM AFFECTS MY ACTIVITY MODERATELY
HOW_WOULD_YOU_RATE_YOUR_CURRENT_LEVEL_OF_FUNCTION?: ABNORMAL
RECREATIONAL_ACTIVITIES: MODERATE DIFFICULTY
STEPPING_UP_AND_DOWN_CURBS: MODERATE DIFFICULTY
TWISTING/PIVOTING_ON_INVOLVED_LEG: MODERATE DIFFICULTY
HEAVY_WORK: MODERATE DIFFICULTY
RUNNING_ONE_MILE: UNABLE TO DO
ROLLING_OVER_IN_BED: SLIGHT DIFFICULTY
STAND: ACTIVITY IS MINIMALLY DIFFICULT
SITTING_FOR_15_MINUTES: MODERATE DIFFICULTY
SPORTS_SCORE(%): 0
SPORTS_TOTAL_ITEM_SCORE: 0
GO UP STAIRS: ACTIVITY IS SOMEWHAT DIFFICULT
ROLLING OVER IN BED: SLIGHT DIFFICULTY
ADL_HIGHEST_POTENTIAL_SCORE: 68
WALKING_UP_STEEP_HILLS: MODERATE DIFFICULTY
DEEP_SQUATTING: MODERATE DIFFICULTY
SQUAT: ACTIVITY IS SOMEWHAT DIFFICULT
TWISTING/PIVOTING ON INVOLVED LEG: MODERATE DIFFICULTY
SWELLING: THE SYMPTOM AFFECTS MY ACTIVITY SLIGHTLY
LIGHT_TO_MODERATE_WORK: MODERATE DIFFICULTY
SWINGING_OBJECTS_LIKE_A_GOLF_CLUB: SLIGHT DIFFICULTY
PUTTING ON SOCKS AND SHOES: SLIGHT DIFFICULTY
STANDING FOR 15 MINUTES: MODERATE DIFFICULTY
LANDING: SLIGHT DIFFICULTY
ADL_COUNT: 17
WALKING_INITIALLY: MODERATE DIFFICULTY
ABILITY_TO_PARTICIPATE_IN_YOUR_DESIRED_SPORT_AS_LONG_AS_YOU_WOULD_LIKE: UNABLE TO DO
WALKING_15_MINUTES_OR_GREATER: MODERATE DIFFICULTY
SWELLING: THE SYMPTOM AFFECTS MY ACTIVITY SLIGHTLY
ADL_SCORE(%): 0
WALKING_UP_STEEP_HILLS: MODERATE DIFFICULTY
WALKING_DOWN_STEEP_HILLS: MODERATE DIFFICULTY
GOING_DOWN_1_FLIGHT_OF_STAIRS: SLIGHT DIFFICULTY
LOW_IMPACT_ACTIVITIES_LIKE_FAST_WALKING: SLIGHT DIFFICULTY
GO UP STAIRS: ACTIVITY IS SOMEWHAT DIFFICULT
WEAKNESS: THE SYMPTOM AFFECTS MY ACTIVITY MODERATELY
SIT WITH YOUR KNEE BENT: ACTIVITY IS NOT DIFFICULT
RAW_SCORE: 45
PLEASE_INDICATE_YOR_PRIMARY_REASON_FOR_REFERRAL_TO_THERAPY:: KNEE
WALKING_FOR_APPROXIMATELY_10_MINUTES: SLIGHT DIFFICULTY
HOW_WOULD_YOU_RATE_THE_CURRENT_FUNCTION_OF_YOUR_KNEE_DURING_YOUR_USUAL_DAILY_ACTIVITIES_ON_A_SCALE_FROM_0_TO_100_WITH_100_BEING_YOUR_LEVEL_OF_KNEE_FUNCTION_PRIOR_TO_YOUR_INJURY_AND_0_BEING_THE_INABILITY_TO_PERFORM_ANY_OF_YOUR_USUAL_DAILY_ACTIVITIES?: 70
HOS_ADL_ITEM_SCORE_TOTAL: 39
GIVING WAY, BUCKLING OR SHIFTING OF KNEE: THE SYMPTOM AFFECTS MY ACTIVITY SLIGHTLY
JUMPING: UNABLE TO DO
ABILITY_TO_PERFORM_ACTIVITY_WITH_YOUR_NORMAL_TECHNIQUE: MODERATE DIFFICULTY
WALKING_15_MINUTES_OR_GREATER: MODERATE DIFFICULTY
SIT WITH YOUR KNEE BENT: ACTIVITY IS NOT DIFFICULT
PAIN: THE SYMPTOM AFFECTS MY ACTIVITY MODERATELY
HOW_WOULD_YOU_RATE_YOUR_CURRENT_LEVEL_OF_FUNCTION_DURING_YOUR_SPORTS_RELATED_ACTIVITIES_FROM_0_TO_100_WITH_100_BEING_YOUR_LEVEL_OF_FUNCTION_PRIOR_TO_YOUR_HIP_PROBLEM_AND_0_BEING_THE_INABILITY_TO_PERFORM_ANY_OF_YOUR_USUAL_DAILY_ACTIVITIES?: 50
HOW_WOULD_YOU_RATE_THE_CURRENT_FUNCTION_OF_YOUR_KNEE_DURING_YOUR_USUAL_DAILY_ACTIVITIES_ON_A_SCALE_FROM_0_TO_100_WITH_100_BEING_YOUR_LEVEL_OF_KNEE_FUNCTION_PRIOR_TO_YOUR_INJURY_AND_0_BEING_THE_INABILITY_TO_PERFORM_ANY_OF_YOUR_USUAL_DAILY_ACTIVITIES?: 70
WALK: ACTIVITY IS MINIMALLY DIFFICULT
ADL_TOTAL_ITEM_SCORE: 0
KNEE_ACTIVITY_OF_DAILY_LIVING_SUM: 45
GOING_UP_1_FLIGHT_OF_STAIRS: MODERATE DIFFICULTY
HOW_WOULD_YOU_RATE_YOUR_CURRENT_LEVEL_OF_FUNCTION_DURING_YOUR_USUAL_ACTIVITIES_OF_DAILY_LIVING_FROM_0_TO_100_WITH_100_BEING_YOUR_LEVEL_OF_FUNCTION_PRIOR_TO_YOUR_HIP_PROBLEM_AND_0_BEING_THE_INABILITY_TO_PERFORM_ANY_OF_YOUR_USUAL_DAILY_ACTIVITIES?: 70
STANDING_FOR_15_MINUTES: MODERATE DIFFICULTY
HEAVY_WORK: MODERATE DIFFICULTY
GETTING_INTO_AND_OUT_OF_A_BATHTUB: SLIGHT DIFFICULTY
STEPPING UP AND DOWN CURBS: MODERATE DIFFICULTY
RISE FROM A CHAIR: ACTIVITY IS MINIMALLY DIFFICULT
PAIN: THE SYMPTOM AFFECTS MY ACTIVITY MODERATELY
HOS_ADL_HIGHEST_POTENTIAL_SCORE: 68
GETTING INTO AND OUT OF AN AVERAGE CAR: SLIGHT DIFFICULTY
STARTING_AND_STOPPING_QUICKLY: MODERATE DIFFICULTY
SPORTS_HIGHEST_POTENTIAL_SCORE: 36
GETTING_INTO_AND_OUT_OF_A_BATHTUB: SLIGHT DIFFICULTY
LIMPING: I HAVE THE SYMPTOM BUT IT DOES NOT AFFECT MY ACTIVITY
HOW_WOULD_YOU_RATE_YOUR_CURRENT_LEVEL_OF_FUNCTION_DURING_YOUR_USUAL_ACTIVITIES_OF_DAILY_LIVING_FROM_0_TO_100_WITH_100_BEING_YOUR_LEVEL_OF_FUNCTION_PRIOR_TO_YOUR_HIP_PROBLEM_AND_0_BEING_THE_INABILITY_TO_PERFORM_ANY_OF_YOUR_USUAL_DAILY_ACTIVITIES?: 70
DEEP SQUATTING: MODERATE DIFFICULTY
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
## 2025-08-05 ENCOUNTER — THERAPY VISIT (OUTPATIENT)
Dept: PHYSICAL THERAPY | Facility: CLINIC | Age: 70
End: 2025-08-05
Attending: PHYSICIAN ASSISTANT
Payer: COMMERCIAL

## 2025-08-05 DIAGNOSIS — S76.312A HAMSTRING STRAIN, LEFT, INITIAL ENCOUNTER: Primary | ICD-10-CM

## 2025-08-05 PROCEDURE — 97110 THERAPEUTIC EXERCISES: CPT | Mod: GP | Performed by: PHYSICAL THERAPIST

## 2025-08-05 PROCEDURE — 97140 MANUAL THERAPY 1/> REGIONS: CPT | Mod: GP | Performed by: PHYSICAL THERAPIST

## (undated) RX ORDER — LIDOCAINE HYDROCHLORIDE 10 MG/ML
INJECTION, SOLUTION EPIDURAL; INFILTRATION; INTRACAUDAL; PERINEURAL
Status: DISPENSED
Start: 2022-11-15